# Patient Record
Sex: FEMALE | Race: WHITE | NOT HISPANIC OR LATINO | Employment: FULL TIME | URBAN - NONMETROPOLITAN AREA
[De-identification: names, ages, dates, MRNs, and addresses within clinical notes are randomized per-mention and may not be internally consistent; named-entity substitution may affect disease eponyms.]

---

## 2017-01-30 ENCOUNTER — OFFICE VISIT (OUTPATIENT)
Dept: SURGERY | Facility: CLINIC | Age: 52
End: 2017-01-30

## 2017-01-30 VITALS
HEIGHT: 67 IN | DIASTOLIC BLOOD PRESSURE: 78 MMHG | WEIGHT: 150 LBS | BODY MASS INDEX: 23.54 KG/M2 | SYSTOLIC BLOOD PRESSURE: 114 MMHG

## 2017-01-30 DIAGNOSIS — E04.1 THYROID NODULE: Primary | ICD-10-CM

## 2017-01-30 PROCEDURE — 99203 OFFICE O/P NEW LOW 30 MIN: CPT | Performed by: SURGERY

## 2017-01-30 RX ORDER — MELATONIN
1000 DAILY
COMMUNITY
End: 2019-02-21

## 2017-01-30 RX ORDER — LORAZEPAM 0.5 MG
1 TABLET ORAL DAILY
COMMUNITY
End: 2019-02-21

## 2017-01-30 RX ORDER — DESOXIMETASONE 2.5 MG/G
1 CREAM TOPICAL AS NEEDED
COMMUNITY
End: 2019-02-21

## 2017-01-30 RX ORDER — BIOTIN 10000 MCG
10 CAPSULE ORAL DAILY
COMMUNITY
End: 2019-02-21

## 2017-01-30 RX ORDER — PERPHENAZINE/AMITRIPTYLINE HCL 4MG-10MG
1000 TABLET ORAL DAILY
COMMUNITY
End: 2019-02-21

## 2017-01-30 NOTE — PROGRESS NOTES
Chief Complaint   Patient presents with   • Mass     possible thyroid nodule     Chief Complaint   Patient presents with   • Mass     possible thyroid nodule       HPI    Thyroid Nodule  Patient complains of a left thyroid nodule. The lesion was noted 1 month ago.  It has not increased in size since that time.Patient currently has symptoms of none, that have been unchanged. Symptoms have been present for 1 months. Patient denies change in size of nodule.  There is a family history of hypothyroidism. There is not a history of radiation to the neck, head or face.  Previous work up has been an US.    Past Medical History   Diagnosis Date   • Cyst of left breast        Past Surgical History   Procedure Laterality Date   • Breast cyst aspiration  10/13/2013     Fine Needle Aspiration with Imaging Guidance 57822 (Breast cyst)          Current Outpatient Prescriptions:   •  Biotin 10 MG capsule, Take 10 capsules by mouth Daily., Disp: , Rfl:   •  cholecalciferol (VITAMIN D3) 1000 UNITS tablet, Take 1,000 Units by mouth Daily., Disp: , Rfl:   •  Coconut Oil (EQL COCONUT OIL) 1000 MG capsule, Take  by mouth Daily., Disp: , Rfl:   •  desoximetasone (TOPICORT) 0.25 % cream, Apply  topically As Needed for irritation., Disp: , Rfl:   •  Misc Natural Products (TART CHERRY ADVANCED) capsule, Take  by mouth Daily., Disp: , Rfl:   •  lisinopril (PRINIVIL,ZESTRIL) 10 MG tablet, Take 10 mg by mouth Daily., Disp: , Rfl:   •  Multiple Vitamin (MULTI VITAMIN DAILY PO), Take 1 tablet by mouth Daily., Disp: , Rfl:   •  Multiple Vitamins-Minerals (MULTIVITAMIN PO), Take 1 tablet by mouth Daily., Disp: , Rfl:     Allergies   Allergen Reactions   • Aleve [Naproxen]    • Other      LACORIC ACID       No family history on file.    Social History     Social History   • Marital status: Single     Spouse name: N/A   • Number of children: N/A   • Years of education: N/A     Occupational History   • Not on file.     Social History Main Topics   •  "Smoking status: Never Smoker   • Smokeless tobacco: Not on file   • Alcohol use No   • Drug use: Not on file   • Sexual activity: Not on file     Other Topics Concern   • Not on file     Social History Narrative       Review of Systems   Constitutional: Negative for appetite change, chills, fever and unexpected weight change.   HENT: Negative for hearing loss, nosebleeds and trouble swallowing.    Eyes: Negative for visual disturbance.   Respiratory: Negative for apnea, cough, choking, chest tightness, shortness of breath, wheezing and stridor.    Cardiovascular: Positive for leg swelling. Negative for chest pain and palpitations.   Gastrointestinal: Negative for abdominal distention, abdominal pain, blood in stool, constipation, diarrhea, nausea and vomiting.   Endocrine: Negative for cold intolerance, heat intolerance, polydipsia, polyphagia and polyuria.   Genitourinary: Negative for difficulty urinating, dysuria, frequency, hematuria and urgency.   Musculoskeletal: Negative for arthralgias, back pain, myalgias and neck pain.   Skin: Negative for color change, pallor and rash.   Allergic/Immunologic: Negative for immunocompromised state.   Neurological: Negative for dizziness, seizures, syncope, light-headedness, numbness and headaches.   Hematological: Negative for adenopathy.   Psychiatric/Behavioral: Negative for suicidal ideas. The patient is not nervous/anxious.          Visit Vitals   • /78   • Ht 67\" (170.2 cm)   • Wt 150 lb (68 kg)   • BMI 23.49 kg/m2       Physical Exam   Constitutional: She appears well-developed and well-nourished. No distress.   HENT:   Head: Normocephalic and atraumatic.   Eyes: Conjunctivae and EOM are normal. Pupils are equal, round, and reactive to light.   Neck: Normal range of motion. No JVD present. No tracheal deviation present. Thyromegaly present.   Cardiovascular: Normal rate, regular rhythm and normal heart sounds.    No murmur heard.  Pulmonary/Chest: Effort normal " and breath sounds normal. No stridor. No respiratory distress. She has no wheezes. She exhibits no tenderness.   Musculoskeletal: Normal range of motion. She exhibits no edema, tenderness or deformity.   Lymphadenopathy:     She has no cervical adenopathy.   Skin: Skin is warm and dry. No rash noted. She is not diaphoretic. No erythema. No pallor.   Psychiatric: She has a normal mood and affect. Her behavior is normal. Judgment and thought content normal.   Nursing note and vitals reviewed.      DIAGNOSTIC DATA:    [facility] shows a LEFT mass in the thyroid    ASSESSMENT:    Carli was seen today for mass.    Diagnoses and all orders for this visit:    Thyroid nodule       PLAN:    FNA- risks of bleeding, infection explained.    Case Rocha MD

## 2017-02-01 ENCOUNTER — HOSPITAL ENCOUNTER (OUTPATIENT)
Dept: ULTRASOUND IMAGING | Facility: HOSPITAL | Age: 52
Discharge: HOME | End: 2017-02-01
Admitting: SURGERY

## 2017-02-01 DIAGNOSIS — E04.1 THYROID NODULE: ICD-10-CM

## 2017-02-01 LAB
LAB AP CASE REPORT: NORMAL
LAB AP DIAGNOSIS COMMENT: NORMAL
LAB AP NON-GYN SPECIMEN ADEQUACY: NORMAL
Lab: NORMAL
PATH REPORT.FINAL DX SPEC: NORMAL

## 2017-02-01 PROCEDURE — 88173 CYTOPATH EVAL FNA REPORT: CPT | Performed by: SURGERY

## 2017-02-01 PROCEDURE — 10022 US GUIDED FINE NEEDLE ASPIRATION: CPT | Performed by: SURGERY

## 2017-02-01 PROCEDURE — 88173 CYTOPATH EVAL FNA REPORT: CPT | Performed by: PATHOLOGY

## 2017-02-01 PROCEDURE — 76942 ECHO GUIDE FOR BIOPSY: CPT

## 2017-02-01 RX ORDER — LIDOCAINE HYDROCHLORIDE 10 MG/ML
10 INJECTION, SOLUTION INFILTRATION; PERINEURAL ONCE
Status: DISCONTINUED | OUTPATIENT
Start: 2017-02-01 | End: 2017-02-02 | Stop reason: HOSPADM

## 2017-02-03 ENCOUNTER — APPOINTMENT (OUTPATIENT)
Dept: PREADMISSION TESTING | Facility: HOSPITAL | Age: 52
End: 2017-02-03

## 2017-02-03 ENCOUNTER — OFFICE VISIT (OUTPATIENT)
Dept: SURGERY | Facility: CLINIC | Age: 52
End: 2017-02-03

## 2017-02-03 VITALS
DIASTOLIC BLOOD PRESSURE: 78 MMHG | WEIGHT: 159 LBS | BODY MASS INDEX: 24.96 KG/M2 | SYSTOLIC BLOOD PRESSURE: 112 MMHG | HEIGHT: 67 IN

## 2017-02-03 VITALS
WEIGHT: 158 LBS | RESPIRATION RATE: 18 BRPM | HEIGHT: 67 IN | BODY MASS INDEX: 24.8 KG/M2 | OXYGEN SATURATION: 99 % | DIASTOLIC BLOOD PRESSURE: 88 MMHG | SYSTOLIC BLOOD PRESSURE: 150 MMHG | HEART RATE: 79 BPM

## 2017-02-03 DIAGNOSIS — E04.1 THYROID NODULE: ICD-10-CM

## 2017-02-03 DIAGNOSIS — E04.1 THYROID NODULE: Primary | ICD-10-CM

## 2017-02-03 LAB — TSH SERPL DL<=0.05 MIU/L-ACNC: 1.85 MIU/ML (ref 0.46–4.68)

## 2017-02-03 PROCEDURE — 93010 ELECTROCARDIOGRAM REPORT: CPT | Performed by: INTERNAL MEDICINE

## 2017-02-03 PROCEDURE — 99212 OFFICE O/P EST SF 10 MIN: CPT | Performed by: SURGERY

## 2017-02-03 PROCEDURE — 93005 ELECTROCARDIOGRAM TRACING: CPT

## 2017-02-03 PROCEDURE — 84443 ASSAY THYROID STIM HORMONE: CPT | Performed by: SURGERY

## 2017-02-03 PROCEDURE — 36415 COLL VENOUS BLD VENIPUNCTURE: CPT

## 2017-02-03 RX ORDER — SODIUM CHLORIDE 9 MG/ML
100 INJECTION, SOLUTION INTRAVENOUS CONTINUOUS
Status: CANCELLED | OUTPATIENT
Start: 2017-02-03

## 2017-02-03 RX ORDER — SODIUM CHLORIDE 0.9 % (FLUSH) 0.9 %
1-10 SYRINGE (ML) INJECTION AS NEEDED
Status: CANCELLED | OUTPATIENT
Start: 2017-02-03

## 2017-02-03 RX ORDER — OXYMETAZOLINE HYDROCHLORIDE 0.05 G/100ML
2 SPRAY NASAL 2 TIMES DAILY PRN
COMMUNITY
End: 2019-02-21

## 2017-02-03 NOTE — PROGRESS NOTES
Chief Complaint   Patient presents with   • Follow-up     Fine needle aspiration. Biopsy of thyroid.     HPI    Final Diagnosis   [facility] Category: SUSPICIOUS FOR FOLLICULAR NEOPLASM   SUSPICIOUS for FOLLICULAR NEOPLASM.     Physical Exam   Neck: Normal range of motion. Neck supple. No JVD present. No tracheal deviation present. No thyromegaly present.       Lymphadenopathy:     She has no cervical adenopathy.     ASSESSMENT    Carli was seen today for follow-up.    Diagnoses and all orders for this visit:    Thyroid nodule  -     Case Request; Standing  -     TSH; Future  -     ECG 12 Lead; Future  -     sodium chloride 0.9 % flush 1-10 mL; Infuse 1-10 mL into a venous catheter As Needed for line care.  -     sodium chloride 0.9 % infusion; Infuse 100 mL/hr into a venous catheter Continuous.  -     ceFAZolin (ANCEF) 2 g in sodium chloride 0.9 % 100 mL IVPB; Infuse 2 g into a venous catheter 1 (One) Time.  -     Case Request    Other orders  -     Obtain informed consent  -     Follow Anesthesia Guidelines / Standing Orders; Standing  -     Verify NPO Status; Standing  -     Obtain Informed Consent; Standing  -     PHONG Hose - Place on Patient in Pre-Op; Standing  -     SCD (Sequential Compression Device) - Place on Patient in Pre-Op; Standing  -     Have Patient Void Prior to Procedure; Standing  -     Insert Peripheral IV; Standing  -     Saline Lock & Maintain IV Access; Standing    PLAN  1. LEFT thyroidectomy, possible total thyroidectomy    Thyroidectomy is explained with the risks and benefits of surgery.  Thyroidectomy is an operation in which one or both lobes of the thyroid gland are removed. The most common indications for thyroidectomy include a large mass in the thyroid gland, difficulties with breathing related to a thyroid mass, difficulties with swallowing, suspected or proven cancer of the thyroid gland and hyperthyroidism (overproduction of the thyroid hormone). The need for  thyroidectomy based on history, the results of a physical examination and tests. The procedure is done under general anesthesia. The extent of surgery (removal of one or both lobes) may sometimes be determined in the course of surgery and sometimes after surgery after microscopic examination of tissue removed during the surgery.   After surgery patients may have difficulty in swallowing. Occasionally, swallowing may even be a little painful.  This pain usually resolves within 24 to 72 hours. Bleeding or infection are also possible short term complications. Although rare in thyroid surgery, some patients may develop a thick scar or keloid.   Two complications specific to thyroid surgery are hypocalcemia and vocal cord weakness or paralysis. Hypocalcemia, or low blood levels of calcium, may occur after complete removal of both thyroid lobes. This condition is caused by injury or interference with the blood supply of four tiny glands called parathyroid glands, which are located within or very close to the thyroid gland. Hypocalcemia is usually temporary, but sometimes may require calcium supplements if sufficiently pronounced. Permanent hypocalcemia is fortunately rare.  This is why, serum calcium, magnesium and phosphorus levels are carefully monitored in the first 24 hours after the surgery.    Vocal cord weakness or paralysis may be caused by swelling, stretching, or injury to the recurrent laryngeal nerve which passes very close to the thyroid gland. Temporary hoarseness may result. Again, this is an uncommon, usually temporary complication. Permanent vocal cord paralysis is rare.  Frozen section and final diagnosis:   During surgery, the specimen removed is examined by the pathologist who performs frozen sections.  In the majority of cases, the pathologist is able to distinguish between benign and malignant lesions.  In a very small percentage of patients,however, the frozen section may not identify a small cancer  which is picked up on permanent sections, a few days later. When this happens, the patient may have to return to surgery for removal of the remaining thyroid tissue and sometimes lymph node dissection.  Depending on the final histologic (microscopic examination) diagnosis of the gland removed, and on the blood tests, continuous follow-up by the endocrinologist and / or surgeon may be indicated for replacement of the thyroid hormone. Following total thyroidectomy, patients have to take replacement thyroid hormone for the rest of their life.  In very rare circumstances, airway obstruction may occur and a tracheotomy may become necessary to gain access to the airway. This is an extremely rare life saving measure and every effort would be taken to avoid it.  I have given the patient an opportunity to ask questions, discuss alternative forms of treatment, risks of nontreatment, the procedures to be used, and the risks and hazards involved.    Signed by Case Rocha MD

## 2017-02-10 ENCOUNTER — ANESTHESIA EVENT (OUTPATIENT)
Dept: PERIOP | Facility: HOSPITAL | Age: 52
End: 2017-02-10

## 2017-02-13 ENCOUNTER — HOSPITAL ENCOUNTER (OUTPATIENT)
Facility: HOSPITAL | Age: 52
Setting detail: HOSPITAL OUTPATIENT SURGERY
Discharge: HOME | End: 2017-02-13
Attending: SURGERY | Admitting: SURGERY

## 2017-02-13 ENCOUNTER — ANESTHESIA (OUTPATIENT)
Dept: PERIOP | Facility: HOSPITAL | Age: 52
End: 2017-02-13

## 2017-02-13 VITALS
HEART RATE: 80 BPM | SYSTOLIC BLOOD PRESSURE: 170 MMHG | RESPIRATION RATE: 18 BRPM | HEIGHT: 67 IN | WEIGHT: 157.63 LBS | TEMPERATURE: 97.4 F | OXYGEN SATURATION: 94 % | BODY MASS INDEX: 24.74 KG/M2 | DIASTOLIC BLOOD PRESSURE: 82 MMHG

## 2017-02-13 DIAGNOSIS — E04.1 THYROID NODULE: ICD-10-CM

## 2017-02-13 PROCEDURE — 88307 TISSUE EXAM BY PATHOLOGIST: CPT | Performed by: SURGERY

## 2017-02-13 PROCEDURE — 88307 TISSUE EXAM BY PATHOLOGIST: CPT | Performed by: PATHOLOGY

## 2017-02-13 PROCEDURE — 25010000002 DEXAMETHASONE PER 1 MG: Performed by: ANESTHESIOLOGY

## 2017-02-13 PROCEDURE — 25010000002 ONDANSETRON PER 1 MG: Performed by: NURSE ANESTHETIST, CERTIFIED REGISTERED

## 2017-02-13 PROCEDURE — 25010000003 CEFAZOLIN PER 500 MG: Performed by: SURGERY

## 2017-02-13 PROCEDURE — 25010000002 FENTANYL CITRATE (PF) 100 MCG/2ML SOLUTION: Performed by: ANESTHESIOLOGY

## 2017-02-13 PROCEDURE — 25010000002 MIDAZOLAM PER 1 MG: Performed by: ANESTHESIOLOGY

## 2017-02-13 PROCEDURE — 25010000002 PROPOFOL 10 MG/ML EMULSION: Performed by: ANESTHESIOLOGY

## 2017-02-13 PROCEDURE — 25010000002 NEOSTIGMINE 10 MG/10ML SOLUTION: Performed by: NURSE ANESTHETIST, CERTIFIED REGISTERED

## 2017-02-13 PROCEDURE — 60220 PARTIAL REMOVAL OF THYROID: CPT | Performed by: SURGERY

## 2017-02-13 RX ORDER — LABETALOL HYDROCHLORIDE 5 MG/ML
5 INJECTION, SOLUTION INTRAVENOUS
Status: DISCONTINUED | OUTPATIENT
Start: 2017-02-13 | End: 2017-02-13 | Stop reason: HOSPADM

## 2017-02-13 RX ORDER — METOCLOPRAMIDE HYDROCHLORIDE 5 MG/ML
10 INJECTION INTRAMUSCULAR; INTRAVENOUS ONCE AS NEEDED
Status: DISCONTINUED | OUTPATIENT
Start: 2017-02-13 | End: 2017-02-13 | Stop reason: HOSPADM

## 2017-02-13 RX ORDER — DIPHENHYDRAMINE HYDROCHLORIDE 50 MG/ML
12.5 INJECTION INTRAMUSCULAR; INTRAVENOUS
Status: DISCONTINUED | OUTPATIENT
Start: 2017-02-13 | End: 2017-02-13 | Stop reason: HOSPADM

## 2017-02-13 RX ORDER — GLYCOPYRROLATE 0.2 MG/ML
INJECTION INTRAMUSCULAR; INTRAVENOUS AS NEEDED
Status: DISCONTINUED | OUTPATIENT
Start: 2017-02-13 | End: 2017-02-13 | Stop reason: SURG

## 2017-02-13 RX ORDER — ACETAMINOPHEN 650 MG/1
650 SUPPOSITORY RECTAL ONCE AS NEEDED
Status: DISCONTINUED | OUTPATIENT
Start: 2017-02-13 | End: 2017-02-13 | Stop reason: SDUPTHER

## 2017-02-13 RX ORDER — NALOXONE HCL 0.4 MG/ML
0.2 VIAL (ML) INJECTION AS NEEDED
Status: DISCONTINUED | OUTPATIENT
Start: 2017-02-13 | End: 2017-02-13 | Stop reason: HOSPADM

## 2017-02-13 RX ORDER — MIDAZOLAM HYDROCHLORIDE 1 MG/ML
INJECTION INTRAMUSCULAR; INTRAVENOUS AS NEEDED
Status: DISCONTINUED | OUTPATIENT
Start: 2017-02-13 | End: 2017-02-13 | Stop reason: SURG

## 2017-02-13 RX ORDER — ONDANSETRON 2 MG/ML
4 INJECTION INTRAMUSCULAR; INTRAVENOUS EVERY 6 HOURS PRN
Status: DISCONTINUED | OUTPATIENT
Start: 2017-02-13 | End: 2017-02-13 | Stop reason: HOSPADM

## 2017-02-13 RX ORDER — ACETAMINOPHEN 325 MG/1
650 TABLET ORAL ONCE AS NEEDED
Status: DISCONTINUED | OUTPATIENT
Start: 2017-02-13 | End: 2017-02-13 | Stop reason: SDUPTHER

## 2017-02-13 RX ORDER — FLUMAZENIL 0.1 MG/ML
0.2 INJECTION INTRAVENOUS AS NEEDED
Status: DISCONTINUED | OUTPATIENT
Start: 2017-02-13 | End: 2017-02-13 | Stop reason: HOSPADM

## 2017-02-13 RX ORDER — ROCURONIUM BROMIDE 10 MG/ML
INJECTION, SOLUTION INTRAVENOUS AS NEEDED
Status: DISCONTINUED | OUTPATIENT
Start: 2017-02-13 | End: 2017-02-13 | Stop reason: SURG

## 2017-02-13 RX ORDER — SODIUM CHLORIDE 0.9 % (FLUSH) 0.9 %
1-10 SYRINGE (ML) INJECTION AS NEEDED
Status: DISCONTINUED | OUTPATIENT
Start: 2017-02-13 | End: 2017-02-13 | Stop reason: HOSPADM

## 2017-02-13 RX ORDER — FENTANYL CITRATE 50 UG/ML
INJECTION, SOLUTION INTRAMUSCULAR; INTRAVENOUS AS NEEDED
Status: DISCONTINUED | OUTPATIENT
Start: 2017-02-13 | End: 2017-02-13 | Stop reason: SURG

## 2017-02-13 RX ORDER — NEOSTIGMINE METHYLSULFATE 1 MG/ML
INJECTION, SOLUTION INTRAVENOUS AS NEEDED
Status: DISCONTINUED | OUTPATIENT
Start: 2017-02-13 | End: 2017-02-13 | Stop reason: SURG

## 2017-02-13 RX ORDER — HYDROCODONE BITARTRATE AND ACETAMINOPHEN 7.5; 325 MG/1; MG/1
1 TABLET ORAL EVERY 4 HOURS PRN
Qty: 20 TABLET | Refills: 0 | Status: SHIPPED | OUTPATIENT
Start: 2017-02-13 | End: 2017-03-08 | Stop reason: HOSPADM

## 2017-02-13 RX ORDER — DIPHENHYDRAMINE HYDROCHLORIDE 50 MG/ML
12.5 INJECTION INTRAMUSCULAR; INTRAVENOUS
Status: DISCONTINUED | OUTPATIENT
Start: 2017-02-13 | End: 2017-02-13 | Stop reason: SDUPTHER

## 2017-02-13 RX ORDER — HYDRALAZINE HYDROCHLORIDE 20 MG/ML
5 INJECTION INTRAMUSCULAR; INTRAVENOUS
Status: DISCONTINUED | OUTPATIENT
Start: 2017-02-13 | End: 2017-02-13 | Stop reason: HOSPADM

## 2017-02-13 RX ORDER — ACETAMINOPHEN 650 MG/1
650 SUPPOSITORY RECTAL ONCE AS NEEDED
Status: DISCONTINUED | OUTPATIENT
Start: 2017-02-13 | End: 2017-02-13 | Stop reason: HOSPADM

## 2017-02-13 RX ORDER — HYDRALAZINE HYDROCHLORIDE 20 MG/ML
5 INJECTION INTRAMUSCULAR; INTRAVENOUS
Status: DISCONTINUED | OUTPATIENT
Start: 2017-02-13 | End: 2017-02-13 | Stop reason: SDUPTHER

## 2017-02-13 RX ORDER — DEXAMETHASONE SODIUM PHOSPHATE 4 MG/ML
8 INJECTION, SOLUTION INTRA-ARTICULAR; INTRALESIONAL; INTRAMUSCULAR; INTRAVENOUS; SOFT TISSUE ONCE AS NEEDED
Status: DISCONTINUED | OUTPATIENT
Start: 2017-02-13 | End: 2017-02-13 | Stop reason: HOSPADM

## 2017-02-13 RX ORDER — ACETAMINOPHEN 325 MG/1
650 TABLET ORAL ONCE AS NEEDED
Status: DISCONTINUED | OUTPATIENT
Start: 2017-02-13 | End: 2017-02-13 | Stop reason: HOSPADM

## 2017-02-13 RX ORDER — MORPHINE SULFATE 2 MG/ML
2 INJECTION, SOLUTION INTRAMUSCULAR; INTRAVENOUS
Status: DISCONTINUED | OUTPATIENT
Start: 2017-02-13 | End: 2017-02-13 | Stop reason: HOSPADM

## 2017-02-13 RX ORDER — DEXAMETHASONE SODIUM PHOSPHATE 4 MG/ML
8 INJECTION, SOLUTION INTRA-ARTICULAR; INTRALESIONAL; INTRAMUSCULAR; INTRAVENOUS; SOFT TISSUE ONCE AS NEEDED
Status: COMPLETED | OUTPATIENT
Start: 2017-02-13 | End: 2017-02-13

## 2017-02-13 RX ORDER — ONDANSETRON 2 MG/ML
4 INJECTION INTRAMUSCULAR; INTRAVENOUS ONCE AS NEEDED
Status: COMPLETED | OUTPATIENT
Start: 2017-02-13 | End: 2017-02-13

## 2017-02-13 RX ORDER — HYDROCODONE BITARTRATE AND ACETAMINOPHEN 7.5; 325 MG/1; MG/1
1 TABLET ORAL ONCE AS NEEDED
Status: DISCONTINUED | OUTPATIENT
Start: 2017-02-13 | End: 2017-02-13 | Stop reason: HOSPADM

## 2017-02-13 RX ORDER — PROPOFOL 10 MG/ML
VIAL (ML) INTRAVENOUS AS NEEDED
Status: DISCONTINUED | OUTPATIENT
Start: 2017-02-13 | End: 2017-02-13 | Stop reason: SURG

## 2017-02-13 RX ORDER — SODIUM CHLORIDE 9 MG/ML
100 INJECTION, SOLUTION INTRAVENOUS CONTINUOUS
Status: DISCONTINUED | OUTPATIENT
Start: 2017-02-13 | End: 2017-02-13 | Stop reason: HOSPADM

## 2017-02-13 RX ORDER — ONDANSETRON 2 MG/ML
4 INJECTION INTRAMUSCULAR; INTRAVENOUS ONCE AS NEEDED
Status: DISCONTINUED | OUTPATIENT
Start: 2017-02-13 | End: 2017-02-13 | Stop reason: HOSPADM

## 2017-02-13 RX ADMIN — FENTANYL CITRATE 50 MCG: 50 INJECTION, SOLUTION INTRAMUSCULAR; INTRAVENOUS at 15:25

## 2017-02-13 RX ADMIN — PROPOFOL 150 MG: 10 INJECTION, EMULSION INTRAVENOUS at 15:25

## 2017-02-13 RX ADMIN — FENTANYL CITRATE 50 MCG: 50 INJECTION, SOLUTION INTRAMUSCULAR; INTRAVENOUS at 16:02

## 2017-02-13 RX ADMIN — GLYCOPYRROLATE 0.2 MG: 0.2 INJECTION, SOLUTION INTRAMUSCULAR; INTRAVENOUS at 17:29

## 2017-02-13 RX ADMIN — NEOSTIGMINE METHYLSULFATE 1 MG: 1 INJECTION, SOLUTION INTRAVENOUS at 17:29

## 2017-02-13 RX ADMIN — FENTANYL CITRATE 50 MCG: 50 INJECTION, SOLUTION INTRAMUSCULAR; INTRAVENOUS at 15:58

## 2017-02-13 RX ADMIN — MIDAZOLAM 2 MG: 1 INJECTION INTRAMUSCULAR; INTRAVENOUS at 15:16

## 2017-02-13 RX ADMIN — ROCURONIUM BROMIDE 20 MG: 10 INJECTION INTRAVENOUS at 15:55

## 2017-02-13 RX ADMIN — FENTANYL CITRATE 50 MCG: 50 INJECTION, SOLUTION INTRAMUSCULAR; INTRAVENOUS at 16:15

## 2017-02-13 RX ADMIN — CEFAZOLIN SODIUM 2 G: 1 INJECTION, POWDER, FOR SOLUTION INTRAMUSCULAR; INTRAVENOUS at 15:32

## 2017-02-13 RX ADMIN — DEXAMETHASONE SODIUM PHOSPHATE 8 MG: 4 INJECTION, SOLUTION INTRAMUSCULAR; INTRAVENOUS at 18:20

## 2017-02-13 RX ADMIN — FENTANYL CITRATE 50 MCG: 50 INJECTION, SOLUTION INTRAMUSCULAR; INTRAVENOUS at 16:20

## 2017-02-13 RX ADMIN — ONDANSETRON 4 MG: 2 INJECTION INTRAMUSCULAR; INTRAVENOUS at 17:57

## 2017-02-13 RX ADMIN — ROCURONIUM BROMIDE 30 MG: 10 INJECTION INTRAVENOUS at 15:25

## 2017-02-13 RX ADMIN — SODIUM CHLORIDE 100 ML/HR: 9 INJECTION, SOLUTION INTRAVENOUS at 13:35

## 2017-02-13 NOTE — ANESTHESIA PROCEDURE NOTES
Airway  Airway not difficult    General Information and Staff    Anesthesiologist: TONYA MARTIN    Indications and Patient Condition    Preoxygenated: yes  Mask difficulty assessment: 1 - vent by mask    Final Airway Details  Final airway type: endotracheal airway      Successful airway: ETT  Cuffed: yes   Successful intubation technique: direct laryngoscopy  Endotracheal tube insertion site: oral  Blade: Eugenio  Blade size: #3  ETT size: 7.0 mm  Cormack-Lehane Classification: grade IIb - view of arytenoids or posterior of glottis only  Placement verified by: chest auscultation and capnometry   Inital cuff pressure (cm H2O): 21    Additional Comments  Teeth checked after intubation, no damage noted.

## 2017-02-13 NOTE — DISCHARGE INSTRUCTIONS
THYROID/PARATHYROID ADENOMA EXCISION  POST OP INSTRUCTIONS  Dr. Case Rocha  [address] [place] [address]  Phone: [phone] (office)  [phone] (hospital)    ACTIVITIES:  1. Keep your head elevated at 30 degrees or more as much as possible for the first 24 hours after surgery.  This will help reduce your postoperative swelling and thus decrease your pain.  2. Expect to rest most of the first two days after surgery, but do get up several times daily to reduce the risk of getting a clot in your legs.  3. No strenuous activity or lifting over 10 lbs. for one week.  Add 5 lbs. a week to that restriction until 6 weeks out from surgery.  Try to avoid squatting and deep bends for about a week.  4. Do not drive while on pain medicine.  You must be off pain meds 24 hours before driving.  5. You can climb stairs, but minimize this and initially do one step at a time (both feet on one step rather than going up with each step.)  SYMPTOMS:  1. Some minor discomfort with swallowing can be expected.  Report any extensive difficulty to the office immediately.  Most symptoms resolve within a few days.   2. You may have numbness around the mouth or cramping after surgery. This may be a sign of a low calcium level or a rapid drop in calcium level. You may take calcitriol if prescribed as directed for your symptoms to help with calcium absorption.  3. Some neck swelling is expected and will resolve over the next few weeks.  Rapid neck swelling should prompt a call to the office or 911.  4. Constipation is common when taking pain medication for surgery.  Over the counter laxatives, such as Miralax, can be used temporarily.   5. Fatigue and decreased stamina is not unusual for about a week or so after surgery due to anesthesia.  Try to take walks and some mild activity between resting.  WOUND SITE:  1. Dressings for this surgery are minimal and consist of a steristrip over the incision with sutures internally.  This  will begin to peel off after the first couple of weeks.  2. Showering may occur while the “film” is in place the day after surgery.   MEALS:  1. A soft diet is recommended the first 1-2 days after surgery and this can be expanded to a regular diet as tolerated.  2. Do NOT take pain pills on an empty stomach.  WORK:  1. In general if you have a sedentary job, you can return to work in 7-10 days.  If lifting or exertion is required it may be 2-3 weeks.    2. Use discretion and remember that your stamina will be decreased post operatively.  3. Return to work notes can be provided at the time of your post-operative appointment.  FOLLOW UP:  1. If no appointment is given, please call and make a post-operative appointment for approximately 7-10 days after the procedure.      MEDICATIONS:  1. You are being discharged with [place] for pain.

## 2017-02-13 NOTE — OP NOTE
THYROIDECTOMY  Procedure Note    Carli Helms Mlcrystal  2/13/2017    Pre-op Diagnosis:   Thyroid nodule [E04.1]    Post-op Diagnosis:     Post-Op Diagnosis Codes:     * Thyroid nodule [E04.1]    Procedure(s):  LEFT THYROIDECTOMY, POSSIBLE TOTAL    Surgeon(s):  Case Rocha MD    Anesthesia: General    Staff:   Circulator: Anna Taveras, CHRISSIE; Nikkie Zaman RN  Scrub Person: Cecilia Orozco; Christen Garcia V  Assistant: Sruthi Valdez CSA    Estimated Blood Loss: 20 cc  Specimens:                  ID Type Source Tests Collected by Time Destination   A : Left thyroid lobe and isthmus Tissue Thyroid TISSUE EXAM Case Rocha MD 2/13/2017 1620          Drains:    None       Findings: LEFT thyroid mass    Complications: None    PROCEDURE: The patient is brought to the operating room and placed supine on the operating table.  The neck is prepped and draped in a sterile manner and extended.  A transverse, low collar skin incision is made and carried through the platysma.  The skin is retracted and subplatysmal flaps were created superiorly and inferiorly.  The straps were  and the anterior surface of the thyroid came into view. The left side of the thyroid was palpably normal. The left side demonstrated a large nodular mass in the middle portion.  The upper and lower pole vessels were encircled with large blue loops.  The recurrent nerve was clearly identified after careful dissection.  It was uninjured through its entire course.the upper pole was taken down by doubly clamping and tying  and suture ligating the vessels with 3-0 silk suture.  The smaller vessels were taken down between clamps and ties of 3-0 silk suture, and bipolar cautery was used to divide smaller vessels away from the nerve.  In this manner, the thyroid was rotated out of its bed and dissected free of the trachea until the junction of the right lobe and the isthmus was achieved.  At this point I placed a right angle clamp across the isthmus and  divided the junction of the right lobe and the isthmus sharply.  The specimen was removed from the operative field.  The isthmus was then suture ligated with 3-0 silk suture.  No bleeding was noted from any site. The patient was given several deep breaths and no bleeding was noted.  Both parathyroids and recurrent nerve were visualized. A small piece of Surgicel Snow as placed into the wound bed for added hemostasis.  The wraps were then brought together with interrupted 3-0 Vicryl sutures.  The platysma was brought together with interrupted 3-0 Vicryl suture.  The skin was brought together with a running 4-0 subcuticular Vicryl suture.  Procedure was terminated. The patient tolerated it well. Sponge and needle counts were correct.  The patient was returned to recovery in satisfactory condition.      Caes Rocha MD     Date: 2/13/2017  Time: 5:52 PM

## 2017-02-13 NOTE — ANESTHESIA PREPROCEDURE EVALUATION
Anesthesia Evaluation     no history of anesthetic complications:  NPO Status: > 8 hours   Airway   Mallampati: II  no difficulty expected  Dental      Pulmonary - normal exam   (+) sleep apnea on CPAP,   (-) COPD, asthma, not a smoker  Cardiovascular - normal exam  Exercise tolerance: good (4-7 METS)    (+) hypertension (hx htn, no meds now),   (-) past MI, CAD, dysrhythmias, angina, cardiac stents, CABG      Neuro/Psych  (-) CVA, syncope  GI/Hepatic/Renal/Endo    (-) renal disease, diabetes, hypothyroidism    Musculoskeletal     Abdominal    Substance History      OB/GYN          Other                                    Anesthesia Plan    ASA 2     general   (Fire safety precautions  )  Anesthetic plan and risks discussed with patient.

## 2017-02-13 NOTE — H&P (VIEW-ONLY)
Chief Complaint   Patient presents with   • Follow-up     Fine needle aspiration. Biopsy of thyroid.     HPI    Final Diagnosis   [facility] Category: SUSPICIOUS FOR FOLLICULAR NEOPLASM   SUSPICIOUS for FOLLICULAR NEOPLASM.     Physical Exam   Neck: Normal range of motion. Neck supple. No JVD present. No tracheal deviation present. No thyromegaly present.       Lymphadenopathy:     She has no cervical adenopathy.     ASSESSMENT    Carli was seen today for follow-up.    Diagnoses and all orders for this visit:    Thyroid nodule  -     Case Request; Standing  -     TSH; Future  -     ECG 12 Lead; Future  -     sodium chloride 0.9 % flush 1-10 mL; Infuse 1-10 mL into a venous catheter As Needed for line care.  -     sodium chloride 0.9 % infusion; Infuse 100 mL/hr into a venous catheter Continuous.  -     ceFAZolin (ANCEF) 2 g in sodium chloride 0.9 % 100 mL IVPB; Infuse 2 g into a venous catheter 1 (One) Time.  -     Case Request    Other orders  -     Obtain informed consent  -     Follow Anesthesia Guidelines / Standing Orders; Standing  -     Verify NPO Status; Standing  -     Obtain Informed Consent; Standing  -     PHONG Hose - Place on Patient in Pre-Op; Standing  -     SCD (Sequential Compression Device) - Place on Patient in Pre-Op; Standing  -     Have Patient Void Prior to Procedure; Standing  -     Insert Peripheral IV; Standing  -     Saline Lock & Maintain IV Access; Standing    PLAN  1. LEFT thyroidectomy, possible total thyroidectomy    Thyroidectomy is explained with the risks and benefits of surgery.  Thyroidectomy is an operation in which one or both lobes of the thyroid gland are removed. The most common indications for thyroidectomy include a large mass in the thyroid gland, difficulties with breathing related to a thyroid mass, difficulties with swallowing, suspected or proven cancer of the thyroid gland and hyperthyroidism (overproduction of the thyroid hormone). The need for  thyroidectomy based on history, the results of a physical examination and tests. The procedure is done under general anesthesia. The extent of surgery (removal of one or both lobes) may sometimes be determined in the course of surgery and sometimes after surgery after microscopic examination of tissue removed during the surgery.   After surgery patients may have difficulty in swallowing. Occasionally, swallowing may even be a little painful.  This pain usually resolves within 24 to 72 hours. Bleeding or infection are also possible short term complications. Although rare in thyroid surgery, some patients may develop a thick scar or keloid.   Two complications specific to thyroid surgery are hypocalcemia and vocal cord weakness or paralysis. Hypocalcemia, or low blood levels of calcium, may occur after complete removal of both thyroid lobes. This condition is caused by injury or interference with the blood supply of four tiny glands called parathyroid glands, which are located within or very close to the thyroid gland. Hypocalcemia is usually temporary, but sometimes may require calcium supplements if sufficiently pronounced. Permanent hypocalcemia is fortunately rare.  This is why, serum calcium, magnesium and phosphorus levels are carefully monitored in the first 24 hours after the surgery.    Vocal cord weakness or paralysis may be caused by swelling, stretching, or injury to the recurrent laryngeal nerve which passes very close to the thyroid gland. Temporary hoarseness may result. Again, this is an uncommon, usually temporary complication. Permanent vocal cord paralysis is rare.  Frozen section and final diagnosis:   During surgery, the specimen removed is examined by the pathologist who performs frozen sections.  In the majority of cases, the pathologist is able to distinguish between benign and malignant lesions.  In a very small percentage of patients,however, the frozen section may not identify a small cancer  which is picked up on permanent sections, a few days later. When this happens, the patient may have to return to surgery for removal of the remaining thyroid tissue and sometimes lymph node dissection.  Depending on the final histologic (microscopic examination) diagnosis of the gland removed, and on the blood tests, continuous follow-up by the endocrinologist and / or surgeon may be indicated for replacement of the thyroid hormone. Following total thyroidectomy, patients have to take replacement thyroid hormone for the rest of their life.  In very rare circumstances, airway obstruction may occur and a tracheotomy may become necessary to gain access to the airway. This is an extremely rare life saving measure and every effort would be taken to avoid it.  I have given the patient an opportunity to ask questions, discuss alternative forms of treatment, risks of nontreatment, the procedures to be used, and the risks and hazards involved.    Signed by Case Rocha MD

## 2017-02-13 NOTE — H&P (VIEW-ONLY)
Chief Complaint   Patient presents with   • Mass     possible thyroid nodule     Chief Complaint   Patient presents with   • Mass     possible thyroid nodule       HPI    Thyroid Nodule  Patient complains of a left thyroid nodule. The lesion was noted 1 month ago.  It has not increased in size since that time.Patient currently has symptoms of none, that have been unchanged. Symptoms have been present for 1 months. Patient denies change in size of nodule.  There is a family history of hypothyroidism. There is not a history of radiation to the neck, head or face.  Previous work up has been an US.    Past Medical History   Diagnosis Date   • Cyst of left breast        Past Surgical History   Procedure Laterality Date   • Breast cyst aspiration  10/13/2013     Fine Needle Aspiration with Imaging Guidance 37396 (Breast cyst)          Current Outpatient Prescriptions:   •  Biotin 10 MG capsule, Take 10 capsules by mouth Daily., Disp: , Rfl:   •  cholecalciferol (VITAMIN D3) 1000 UNITS tablet, Take 1,000 Units by mouth Daily., Disp: , Rfl:   •  Coconut Oil (EQL COCONUT OIL) 1000 MG capsule, Take  by mouth Daily., Disp: , Rfl:   •  desoximetasone (TOPICORT) 0.25 % cream, Apply  topically As Needed for irritation., Disp: , Rfl:   •  Misc Natural Products (TART CHERRY ADVANCED) capsule, Take  by mouth Daily., Disp: , Rfl:   •  lisinopril (PRINIVIL,ZESTRIL) 10 MG tablet, Take 10 mg by mouth Daily., Disp: , Rfl:   •  Multiple Vitamin (MULTI VITAMIN DAILY PO), Take 1 tablet by mouth Daily., Disp: , Rfl:   •  Multiple Vitamins-Minerals (MULTIVITAMIN PO), Take 1 tablet by mouth Daily., Disp: , Rfl:     Allergies   Allergen Reactions   • Aleve [Naproxen]    • Other      LACORIC ACID       No family history on file.    Social History     Social History   • Marital status: Single     Spouse name: N/A   • Number of children: N/A   • Years of education: N/A     Occupational History   • Not on file.     Social History Main Topics   •  "Smoking status: Never Smoker   • Smokeless tobacco: Not on file   • Alcohol use No   • Drug use: Not on file   • Sexual activity: Not on file     Other Topics Concern   • Not on file     Social History Narrative       Review of Systems   Constitutional: Negative for appetite change, chills, fever and unexpected weight change.   HENT: Negative for hearing loss, nosebleeds and trouble swallowing.    Eyes: Negative for visual disturbance.   Respiratory: Negative for apnea, cough, choking, chest tightness, shortness of breath, wheezing and stridor.    Cardiovascular: Positive for leg swelling. Negative for chest pain and palpitations.   Gastrointestinal: Negative for abdominal distention, abdominal pain, blood in stool, constipation, diarrhea, nausea and vomiting.   Endocrine: Negative for cold intolerance, heat intolerance, polydipsia, polyphagia and polyuria.   Genitourinary: Negative for difficulty urinating, dysuria, frequency, hematuria and urgency.   Musculoskeletal: Negative for arthralgias, back pain, myalgias and neck pain.   Skin: Negative for color change, pallor and rash.   Allergic/Immunologic: Negative for immunocompromised state.   Neurological: Negative for dizziness, seizures, syncope, light-headedness, numbness and headaches.   Hematological: Negative for adenopathy.   Psychiatric/Behavioral: Negative for suicidal ideas. The patient is not nervous/anxious.          Visit Vitals   • /78   • Ht 67\" (170.2 cm)   • Wt 150 lb (68 kg)   • BMI 23.49 kg/m2       Physical Exam   Constitutional: She appears well-developed and well-nourished. No distress.   HENT:   Head: Normocephalic and atraumatic.   Eyes: Conjunctivae and EOM are normal. Pupils are equal, round, and reactive to light.   Neck: Normal range of motion. No JVD present. No tracheal deviation present. Thyromegaly present.   Cardiovascular: Normal rate, regular rhythm and normal heart sounds.    No murmur heard.  Pulmonary/Chest: Effort normal " and breath sounds normal. No stridor. No respiratory distress. She has no wheezes. She exhibits no tenderness.   Musculoskeletal: Normal range of motion. She exhibits no edema, tenderness or deformity.   Lymphadenopathy:     She has no cervical adenopathy.   Skin: Skin is warm and dry. No rash noted. She is not diaphoretic. No erythema. No pallor.   Psychiatric: She has a normal mood and affect. Her behavior is normal. Judgment and thought content normal.   Nursing note and vitals reviewed.      DIAGNOSTIC DATA:    [facility] a LEFT mass in the thyroid    ASSESSMENT:    Carli was seen today for mass.    Diagnoses and all orders for this visit:    Thyroid nodule       PLAN:    FNA- risks of bleeding, infection explained.    Case Rocha MD

## 2017-02-14 NOTE — DISCHARGE INSTR - APPOINTMENTS
CALL AND MAKE FOLLOW UP APPOINTMENT WITH DR JOYCE AT [facility] FOR 1 WEEK [phone])

## 2017-02-14 NOTE — ANESTHESIA POSTPROCEDURE EVALUATION
Patient: Carli Dunn    Procedure Summary     Date Anesthesia Start Anesthesia Stop Room / Location    02/13/17 8171 3041  [facility] OR 09 / [facility]       Procedure Diagnosis Surgeon Provider    LEFT THYROIDECTOMY, POSSIBLE TOTAL (Left Neck) Thyroid nodule  (Thyroid nodule [E04.1]) MD Juliano Wolf MD          Anesthesia Type: general  Last vitals  /86 (02/13/17 1830)    Temp 97.8 °F (36.6 °C) (02/13/17 1745)    Pulse 78 (02/13/17 1830)   Resp 12 (02/13/17 1830)    SpO2 93 % (02/13/17 1830)      Post Anesthesia Care and Evaluation    Patient location during evaluation: bedside  Patient participation: complete - patient participated  Level of consciousness: awake  Pain management: adequate  Airway patency: patent  Anesthetic complications: No anesthetic complications    Cardiovascular status: acceptable  Respiratory status: acceptable  Hydration status: acceptable    Comments:

## 2017-02-14 NOTE — PLAN OF CARE
Problem: Patient Care Overview (Adult)  Goal: Plan of Care Review  Outcome: Ongoing (interventions implemented as appropriate)    02/13/17 7731   Coping/Psychosocial Response Interventions   Plan Of Care Reviewed With patient   Patient Care Overview   Progress improving   Outcome Evaluation   Outcome Summary/Follow up Plan Pt. awake, vital signs stable. BP remains elevated, does not meet criteria for treatment. Dr. Pérez aware of current BP (175/84) . Nausea improving after meds. Pt. requesting to use the restroom to urinate. Has been unable to successfully use bedpan in PACU. Pt. is calm, no distress noted.

## 2017-02-16 LAB
LAB AP CASE REPORT: NORMAL
LAB AP INTRADEPARTMENTAL CONSULT: NORMAL
Lab: NORMAL
PATH REPORT.FINAL DX SPEC: NORMAL
PATH REPORT.GROSS SPEC: NORMAL

## 2017-02-20 ENCOUNTER — OFFICE VISIT (OUTPATIENT)
Dept: SURGERY | Facility: CLINIC | Age: 52
End: 2017-02-20

## 2017-02-20 VITALS
HEIGHT: 67 IN | WEIGHT: 157 LBS | DIASTOLIC BLOOD PRESSURE: 78 MMHG | BODY MASS INDEX: 24.64 KG/M2 | SYSTOLIC BLOOD PRESSURE: 118 MMHG

## 2017-02-20 DIAGNOSIS — D34 FOLLICULAR ADENOMA: Primary | ICD-10-CM

## 2017-02-20 PROCEDURE — 99024 POSTOP FOLLOW-UP VISIT: CPT | Performed by: SURGERY

## 2017-02-20 NOTE — PROGRESS NOTES
Chief Complaint   Patient presents with   • Follow-up     Post operative thyroidectomy     HPI    Final Diagnosis   LEFT LOBE AND ISTHMUS, THYROID GLAND:  FOLLICULAR ADENOMA.         Electronically signed by Vern Gastelum MD on 2/16/2017 at 1054     Physical Exam   Neck: Normal range of motion. Neck supple.       Nursing note and vitals reviewed.    Strong voice    ASSESSMENT    Carli was seen today for follow-up.    Diagnoses and all orders for this visit:    Follicular adenoma  -     Reflexed T3 Free; Future  -     T4; Future  -     TSH; Future         PLAN    1. Recheck in 3 weeks  2. TFT in 3 weeks    Case Rocha MD

## 2017-03-06 ENCOUNTER — LAB (OUTPATIENT)
Dept: LAB | Facility: HOSPITAL | Age: 52
End: 2017-03-06
Attending: SURGERY

## 2017-03-06 ENCOUNTER — APPOINTMENT (OUTPATIENT)
Dept: LAB | Facility: HOSPITAL | Age: 52
End: 2017-03-06

## 2017-03-06 ENCOUNTER — LAB (OUTPATIENT)
Dept: LAB | Facility: HOSPITAL | Age: 52
End: 2017-03-06

## 2017-03-06 DIAGNOSIS — D34 FOLLICULAR ADENOMA: ICD-10-CM

## 2017-03-06 LAB
T4 SERPL-MCNC: 5.66 MCG/DL (ref 5.5–11)
TSH SERPL DL<=0.05 MIU/L-ACNC: 12.1 MIU/ML (ref 0.46–4.68)

## 2017-03-06 PROCEDURE — 84443 ASSAY THYROID STIM HORMONE: CPT

## 2017-03-06 PROCEDURE — 84436 ASSAY OF TOTAL THYROXINE: CPT

## 2017-03-06 PROCEDURE — 36415 COLL VENOUS BLD VENIPUNCTURE: CPT

## 2017-03-08 ENCOUNTER — OFFICE VISIT (OUTPATIENT)
Dept: SURGERY | Facility: CLINIC | Age: 52
End: 2017-03-08

## 2017-03-08 VITALS
HEIGHT: 67 IN | DIASTOLIC BLOOD PRESSURE: 70 MMHG | WEIGHT: 162 LBS | BODY MASS INDEX: 25.43 KG/M2 | SYSTOLIC BLOOD PRESSURE: 120 MMHG

## 2017-03-08 DIAGNOSIS — E89.0 HX OF THYROIDECTOMY: Primary | ICD-10-CM

## 2017-03-08 PROCEDURE — 99024 POSTOP FOLLOW-UP VISIT: CPT | Performed by: SURGERY

## 2017-03-08 RX ORDER — LEVOTHYROXINE SODIUM 0.1 MG/1
100 TABLET ORAL DAILY
Qty: 30 TABLET | Refills: 11 | Status: SHIPPED | OUTPATIENT
Start: 2017-03-08 | End: 2018-03-08

## 2017-04-03 ENCOUNTER — OFFICE VISIT (OUTPATIENT)
Dept: SURGERY | Facility: CLINIC | Age: 52
End: 2017-04-03

## 2017-04-03 VITALS
SYSTOLIC BLOOD PRESSURE: 110 MMHG | BODY MASS INDEX: 25.43 KG/M2 | DIASTOLIC BLOOD PRESSURE: 82 MMHG | HEIGHT: 67 IN | WEIGHT: 162 LBS

## 2017-04-03 DIAGNOSIS — E03.9 HYPOTHYROIDISM (ACQUIRED): Primary | ICD-10-CM

## 2017-04-03 PROCEDURE — 99024 POSTOP FOLLOW-UP VISIT: CPT | Performed by: SURGERY

## 2017-04-03 NOTE — PROGRESS NOTES
Chief Complaint   Patient presents with   • Follow-up     recheck thyroidectomy    • Neck Pain     left side.        HPI    Feels well. No weakness. On 0.1 mg/day synthroid.     TSH [[id number]] (Order [id number])   Lab   Date: 3/6/2017 Department: [facility]ORAT[facility] Released By: Nando Baldwin Authorizing: Case Rocha MD          TSH   Order: [id number]   Status:  Final result   Visible to patient:  Yes (MyChart) Dx:  Follicular adenoma         Ref Range & Units 4wk ago   1mo ago      TSH 0.460 - 4.680 mIU/mL 12.100 (H) 1.850   Resulting Agency  [facility]      Specimen Collected: 03/06/17  6:03 PM Last Resulted: 03/06/17  7:15 PM                       Related Result Highlights       T4  Final result 3/6/2017            TSH [[id number]] (Order [id number])   Lab   Date: 3/6/2017 Department: [facility]ORATORY Released By: Nando Baldwin Authorizing: Case Rocha MD   Results   TSH (Order [id number])   3/6/2017  7:15 PM   Component Results   Component Value Flag Ref Range Units Status   TSH 12.100 (H) 0.460 - 4.680 mIU/mL Final   Lab and Collection   TSH (Order #[id number]) on 3/6/2017 - Lab and Collection Information   Additional Appointment Information   Visit Type Appointment Made By Appointment Made On   LAB Sridevi VELMA Syed 3/6/2017   Reviewed By Jacque Rocha MD on 3/6/2017  7:25 PM   Call Information      Provider Department [place]   3/6/2017 5:45 PM LAB [facility]ORATORY [facility] Laboratory [facility]   Testing Performed By   Lab - Abbreviation Name Director Address Valid Date Range   [id number] [facility] LAB [facility] Melo Mena MD [[id number]] <br>[address] [address] 10/26/15 1321-Present   Additional Information   Specimen ID Draw Type Specimen Type Specimen Source Bill Type Client ID   [id number] Venipuncture Blood      Specimen Date Taken Specimen Time Taken Specimen Received Date Specimen Received Time Result Date Result Time    Mar 6, 2017  6:03 PM Mar 6, 2017  6:03 PM Mar 6, 2017  7:15 PM   Recipient List for Orders   Sent From To Cc'd Forwarded To Results   3/6/2017  7:01 PM Lab, Background User Case Rocha MD   TSH [[id number]]          Order History   Outpatient   Date/Time Action Taken User Additional Information   03/06/17 1748 Release Nando Baldwin From Order: [id number]   03/06/17 1915 Result Lab, Background User Final   Order Details   Frequency Duration Priority Order Class   None None Routine Lab Collect   Order Information   Order Date/Time Release Date/Time Start Date/Time End Date/Time   02/20/17 10:29 AM 03/06/17 05:48 PM 03/06/17 05:48 PM None   Related Result Highlights       T4  Final result 3/6/2017            Clinical Indications      ICD-10-CM ICD-9-CM   Follicular adenoma     D34 226   Reprint Order Requisition   TSH (Order #[id number]) on 3/6/17   Encounter   View Encounter       Order Provider Info       Office phone Pager E-mail   Ordering User Case Rocha [phone] -- --   Authorizing Provider Case Rocha [phone] -- --   Lab Component SmartPhrase Guide   TSH (Order #[id number]) on 3/6/17   Order Transmittal Tracking   TSH (Order #[id number]) on 3/6/17   Medication Detail      Disp Refills Start End      TSH   3/6/2017 3/6/2017    Class: Lab Collect        Past Medical History:   Diagnosis Date   • Cancer     skin   • Cyst of left breast    • Disease of thyroid gland     nodule, no medications required   • Hypertension     hx.  pt state MD took her off meds   • Sleep apnea    • Sleep apnea        Past Surgical History:   Procedure Laterality Date   • BREAST CYST ASPIRATION  10/13/2013    Fine Needle Aspiration with Imaging Guidance [id number] (Breast cyst)    • NASAL SEPTUM SURGERY     • THYROIDECTOMY Left 2/13/2017    Procedure: LEFT THYROIDECTOMY, POSSIBLE TOTAL;  Surgeon: Case Rocha MD;  Location: [facility];  Service:    • TONSILLECTOMY      trimmed uvula and septoplasty          Current Outpatient  Prescriptions:   •  Biotin 10 MG capsule, Take 10 capsules by mouth Daily., Disp: , Rfl:   •  cholecalciferol (VITAMIN D3) 1000 UNITS tablet, Take 1,000 Units by mouth Daily., Disp: , Rfl:   •  Coconut Oil (EQL COCONUT OIL) 1000 MG capsule, Take 1,000 mg by mouth Daily., Disp: , Rfl:   •  desoximetasone (TOPICORT) 0.25 % cream, Apply 1 application topically As Needed for irritation., Disp: , Rfl:   •  levothyroxine (SYNTHROID) 100 MCG tablet, Take 1 tablet by mouth Daily., Disp: 30 tablet, Rfl: 11  •  Misc Natural Products (TART CHERRY ADVANCED) capsule, Take 1 capsule by mouth Daily., Disp: , Rfl:   •  Multiple Vitamin (MULTI VITAMIN DAILY PO), Take 1 tablet by mouth Daily., Disp: , Rfl:   •  oxymetazoline (NASAL DECONGESTANT SPRAY) 0.05 % nasal spray, 2 sprays into each nostril 2 (Two) Times a Day As Needed for congestion., Disp: , Rfl:     Allergies   Allergen Reactions   • Aleve [Naproxen] Hives   • Other Other (See Comments)     Lactic acid causes skin to peel       No family history on file.    Social History     Social History   • Marital status: Single     Spouse name: N/A   • Number of children: N/A   • Years of education: N/A     Occupational History   • Not on file.     Social History Main Topics   • Smoking status: Never Smoker   • Smokeless tobacco: Never Used   • Alcohol use No   • Drug use: No   • Sexual activity: Not on file     Other Topics Concern   • Not on file     Social History Narrative       Review of Systems    None    Physical Exam   Neck:             ASSESSMENT    Carli was seen today for follow-up and neck pain.    Diagnoses and all orders for this visit:    Hypothyroidism (acquired)  -     T4; Future  -     TSH; Future      PLAN    1. Recheck as needed          This document has been electronically signed by Case Rocha MD on April 3, 2017 11:26 AM

## 2017-07-05 ENCOUNTER — LAB (OUTPATIENT)
Dept: LAB | Facility: HOSPITAL | Age: 52
End: 2017-07-05
Attending: SURGERY

## 2017-07-05 DIAGNOSIS — E03.9 HYPOTHYROIDISM (ACQUIRED): ICD-10-CM

## 2017-07-05 LAB
T4 SERPL-MCNC: 8.08 MCG/DL (ref 5.5–11)
TSH SERPL DL<=0.05 MIU/L-ACNC: 1.29 MIU/ML (ref 0.46–4.68)

## 2017-07-05 PROCEDURE — 36415 COLL VENOUS BLD VENIPUNCTURE: CPT

## 2017-07-05 PROCEDURE — 84443 ASSAY THYROID STIM HORMONE: CPT

## 2017-07-05 PROCEDURE — 84436 ASSAY OF TOTAL THYROXINE: CPT

## 2018-02-20 ENCOUNTER — OFFICE VISIT (OUTPATIENT)
Dept: SLEEP MEDICINE | Facility: HOSPITAL | Age: 53
End: 2018-02-20

## 2018-02-20 VITALS
SYSTOLIC BLOOD PRESSURE: 130 MMHG | BODY MASS INDEX: 25.58 KG/M2 | DIASTOLIC BLOOD PRESSURE: 62 MMHG | HEIGHT: 67 IN | WEIGHT: 163 LBS

## 2018-02-20 DIAGNOSIS — G47.33 OBSTRUCTIVE SLEEP APNEA, ADULT: Primary | ICD-10-CM

## 2018-02-20 PROCEDURE — 99213 OFFICE O/P EST LOW 20 MIN: CPT | Performed by: INTERNAL MEDICINE

## 2018-02-20 NOTE — PROGRESS NOTES
Sleep Clinic Follow Up    Date: 2/20/2018  Primary Care Physician: No Known Provider      Interim History (1/3):  Since the last visit on 02/21/2017, patient has:      1)  JANNET - Has remained compliant with CPAP. She tried oral appliance from ~ 04/2017-08/2017 . She suffered with jaw pain, drooling, bleeding gums, and dry mouth. She went back to CPAP in ~ 08/2017 and has been compliant since that time. She denies mask and machine issues, dry mouth, headaches, pressures intolerance, or non-compliance. She denies abnormal dreams, sleep paralysis. She has allergic rhinitis treated with night time Afrin.     PAP Data:  Time frame: 08/2017-present   Compliance ~ 85%  PAP range : 8 cm H2O  Average 90% pressure: 8 cmH2O  Leak: 0   Average AHI 2.9 events/hr  Mask type: pillows  DME: [facility]    Bed time: 2200  Sleep latency: 30 minutes  Number of times awakens during the night: 2-3  Wake time: 0500  Estimated total sleep time at night: 6-7 hours  Caffeine intake: 1 soda  Alcohol intake: none  Nap time: none  Sleepiness with Driving: none    [place] - 12    2) Patient denies RLS symptoms.     PMHx, FH, SH reviewed and pertinent changes are: partial thyroid resection in 02/2017    REVIEW OF SYSTEMS:   Negative for chest pain, fever, chills, SOA, abdominal pain. Smoking: none      Exam (6-11/12):    Vitals:    02/20/18 1651   BP: 130/62     Body mass index is 25.53 kg/(m^2). Patient's BMI is within normal parameters. No follow-up required.      Gen:  No distress, conversant, pleasant, appears stated age, alert, oriented  Eyes:   Anicteric sclera, moist conjunctiva, no lid lag     PERRLA, EOMI   Heent:   NC/AT    Oropharynx clear, Mallampati 4    normal hearing  Lungs:  Normal effort, non-labored breathing    Clear to auscultation    CV:  Normal S1/S2, no murmur    no lower extremity edema  ABD:  Soft, normal bowel sounds    Psych:  Appropriate affect  Neuro:  CN 2-12 intact    Past Medical History:   Diagnosis Date   • Cancer      skin   • Cyst of left breast    • Disease of thyroid gland     nodule, no medications required   • Hypertension     hx.  pt state MD took her off meds   • Sleep apnea    • Sleep apnea        Current Outpatient Prescriptions:   •  Biotin 10 MG capsule, Take 10 capsules by mouth Daily., Disp: , Rfl:   •  cholecalciferol (VITAMIN D3) 1000 UNITS tablet, Take 1,000 Units by mouth Daily., Disp: , Rfl:   •  Coconut Oil (EQL COCONUT OIL) 1000 MG capsule, Take 1,000 mg by mouth Daily., Disp: , Rfl:   •  desoximetasone (TOPICORT) 0.25 % cream, Apply 1 application topically As Needed for irritation., Disp: , Rfl:   •  levothyroxine (SYNTHROID) 100 MCG tablet, Take 1 tablet by mouth Daily., Disp: 30 tablet, Rfl: 11  •  Misc Natural Products (TART CHERRY ADVANCED) capsule, Take 1 capsule by mouth Daily., Disp: , Rfl:   •  Multiple Vitamin (MULTI VITAMIN DAILY PO), Take 1 tablet by mouth Daily., Disp: , Rfl:   •  oxymetazoline (NASAL DECONGESTANT SPRAY) 0.05 % nasal spray, 2 sprays into each nostril 2 (Two) Times a Day As Needed for congestion., Disp: , Rfl:       ASSESSMENT / PLAN:     1. Obstructive sleep apnea  1. PSG on 08/14/2014, AHI of 7  2. Currently on 8 cm H2O  3. Continue PAP as prescribed.   4. Script for PAP supplies  5. Return to clinic in 1 year with compliance check unless sx change in the interim period.  2. Rhinitis  1. Stop afrin  2. OTC flonase and allegra    Total time 15 min, more than half spent in face to face counseling and coordination of care.     This document has been electronically signed by Jordan Tavares MD on February 20, 2018         CC: No Known Provider          No ref. provider found

## 2019-02-21 ENCOUNTER — OFFICE VISIT (OUTPATIENT)
Dept: SLEEP MEDICINE | Facility: HOSPITAL | Age: 54
End: 2019-02-21

## 2019-02-21 ENCOUNTER — TELEPHONE (OUTPATIENT)
Dept: SURGERY | Facility: CLINIC | Age: 54
End: 2019-02-21

## 2019-02-21 VITALS
OXYGEN SATURATION: 97 % | WEIGHT: 173.1 LBS | BODY MASS INDEX: 27.17 KG/M2 | DIASTOLIC BLOOD PRESSURE: 86 MMHG | SYSTOLIC BLOOD PRESSURE: 156 MMHG | HEART RATE: 78 BPM | HEIGHT: 67 IN

## 2019-02-21 DIAGNOSIS — F51.04 PSYCHOPHYSIOLOGICAL INSOMNIA: ICD-10-CM

## 2019-02-21 DIAGNOSIS — G25.81 RESTLESS LEGS SYNDROME (RLS): ICD-10-CM

## 2019-02-21 DIAGNOSIS — G47.33 OBSTRUCTIVE SLEEP APNEA, ADULT: Primary | ICD-10-CM

## 2019-02-21 PROCEDURE — 99214 OFFICE O/P EST MOD 30 MIN: CPT | Performed by: INTERNAL MEDICINE

## 2019-02-21 NOTE — PROGRESS NOTES
Sleep Clinic Follow Up    Date: 2019  Primary Care Physician: Provider, No Known    Last office visit: 2018 (I reviewed this note)    CC: Follow up: JANNET    Sleep Testin. PSG on 2014, AHI of 7  2. Currently on 8 cm H2O    Assessment and Plan:    1. Obstructive sleep apnea Established, stable (1)  1. Compliant and improved with PAP therapy  2. Continue PAP as prescribed.   3. Script for PAP supplies  4. Oral appliance from ~ 2017-2017   2. Restless leg syndrome/ /Valencia-Ekbom disease (RLS/WED) Self-limited or minor (1/2)  3. Insomnia - sleep onset and or maintenance Established, stable (1)   1. Not on therapy    Interim History (-3/4):  Since the last visit:    1) mild JANNET -  Carli Dunn has remained compliant with CPAP. She denies mask and machine issues, dry mouth, headaches, or pressures intolerance. She denies abnormal dreams, sleep paralysis, nasal congestion, URI sx.    PAP Data:    Time frame: 2018 - 2019   Compliance 82.2 %  Average use on days used: 4hrs 42 min  Percent of days with usage greater than or equal to 4 hours: 57.1%  PAP range : 8 cm H2O  Average 90% pressure: 8 cmH2O  Leak: 0 minutes  Average AHI 4.1 events/hr  Mask type: Nasal pillows  DME: [facility]    Bed time: 2230  Sleep latency: 30-60 minutes  Number of times awakens during the night: 1-3 mild insomnia  Wake time: 0500  Estimated total sleep time at night: 3-5 hours  Caffeine intake: 0oz of coffee, 0oz of tea, and 6oz of soda  Alcohol intake: 0 drinks per week  Nap time: none except on days off   Sleepiness with Driving: none    [place] - 10    2) Patient has intermittent RLS symptoms.     PMHx, FH, SH reviewed and pertinent changes are: unchanged from last office visit on 2018      REVIEW OF SYSTEMS:   Negative for chest pain, fever, cough, SOA, abdominal pain. Smoking:none    Exam ():  Vitals:    19 0849   BP: 156/86   Pulse: 78   SpO2: 97%           19  0849    Weight: 78.5 kg (173 lb 1.6 oz)     Body mass index is 27.1 kg/m². Patient's Body mass index is 27.1 kg/m². BMI is above normal parameters. Recommendations include: referral to primary care.      Gen:  No acute distress, alert, oriented  Lungs:  CTA with normal effort   CV:  RRR, no M/R/G  GI:  soft, non-tender  Psych:  Appropriate affect    Past Medical History:   Diagnosis Date   • Cancer (CMS/HCC)     skin   • Cyst of left breast    • Disease of thyroid gland     nodule, no medications required   • Hypertension     hx.  pt state MD took her off meds   • Sleep apnea    • Sleep apnea      No current outpatient medications on file.      RTC in 12 months     This document has been electronically signed by Jordan Tavares MD on February 21, 2019         CC: Provider, No Known          No ref. provider found

## 2020-02-15 DIAGNOSIS — E89.0 POSTOPERATIVE HYPOTHYROIDISM: Primary | ICD-10-CM

## 2020-02-25 ENCOUNTER — OFFICE VISIT (OUTPATIENT)
Dept: SLEEP MEDICINE | Facility: HOSPITAL | Age: 55
End: 2020-02-25

## 2020-02-25 VITALS
HEIGHT: 67 IN | OXYGEN SATURATION: 97 % | BODY MASS INDEX: 27.78 KG/M2 | SYSTOLIC BLOOD PRESSURE: 127 MMHG | WEIGHT: 177 LBS | HEART RATE: 77 BPM | DIASTOLIC BLOOD PRESSURE: 85 MMHG

## 2020-02-25 DIAGNOSIS — F45.8 OTHER SOMATOFORM DISORDERS: ICD-10-CM

## 2020-02-25 DIAGNOSIS — G47.33 OBSTRUCTIVE SLEEP APNEA, ADULT: Primary | ICD-10-CM

## 2020-02-25 DIAGNOSIS — G25.81 RESTLESS LEGS SYNDROME (RLS): ICD-10-CM

## 2020-02-25 PROCEDURE — 99213 OFFICE O/P EST LOW 20 MIN: CPT | Performed by: NURSE PRACTITIONER

## 2020-02-25 RX ORDER — LISINOPRIL 10 MG/1
TABLET ORAL DAILY
COMMUNITY
Start: 2020-02-04 | End: 2021-03-01 | Stop reason: SDUPTHER

## 2020-02-25 RX ORDER — LEVOTHYROXINE SODIUM 100 MCG
TABLET ORAL
COMMUNITY
Start: 2020-02-17

## 2020-02-25 NOTE — PROGRESS NOTES
Sleep Clinic Follow Up    Date: 2020  Primary Care Physician: Edwina Garcia APRN    Last office visit: 2019 (I reviewed this note)    CC: Follow up: JANNET on CPAP      Interim History:  Since the last visit:    1) mild JANNET -  Carli Dunn has mostly remained compliant with CPAP. She denies mask and machine issues, dry mouth, headaches, or pressures intolerance. She denies abnormal dreams, sleep paralysis, nasal congestion, URI sx.    Sleep Testin. PSG on 2014, AHI of 7   2. Currently on 8 cm H2O    PAP Data:    Time frame: 2019-2020   Compliance 84.3 %  Average use on days used: 4 hrs 44 min  Percent of days with usage greater than or equal to 4 hours: 59.6%  PAP range : 8 cm H2O  Average 90% pressure: 8 cmH2O  Leak: 0 minutes  Average AHI 3.3 events/hr  Mask type: Nasal pillows  [facility]    Bed time: 7650-5492  Sleep latency: 5-60 minutes  Number of times awakens during the night: 1-2  Wake time: 0500  Estimated total sleep time at night: 4-5 hours  Caffeine intake: 0 cups of coffee, 0 cups of tea, and 1-2 sodas per day  Alcohol intake: 0 drinks per week  Nap time: rare   Sleepiness with Driving: occasional    [place] - 14    2) Patient reports frequent RLS symptoms - kicking, burning, pain.    PMHx, FH, SH reviewed and pertinent changes are: Fell and broke nose - following up with ENT.       REVIEW OF SYSTEMS:   +chest pain  Negative for SOA, fever, chills, cough, N/V/D, abdominal pain.    Smoking:none        Exam:  Vitals:    20 0958   BP: 127/85   Pulse: 77   SpO2: 97%           20  0958   Weight: 80.3 kg (177 lb)     Body mass index is 27.72 kg/m². Patient's Body mass index is 27.72 kg/m². BMI is above normal parameters. Recommendations include: referral to primary care.      Gen:                No distress, conversant, pleasant, appears stated age, alert, oriented  Eyes:               Anicteric sclera, moist conjunctiva, no lid lag                            PERRL, EOMI   Heent:             NC/AT                          Oropharynx clear                          Normal hearing  Lungs:             Normal effort, non-labored breathing                          Clear to auscultation bilaterally          CV:                  Normal S1/S2, no murmur                          No lower extremity edema  ABD:               Soft, rounded, non-distended                          Normal bowel sounds                    Psych:             Appropriate affect  Neuro:             CN 2-12 appear intact    Past Medical History:   Diagnosis Date   • Cancer (CMS/HCC)     skin   • Cyst of left breast    • Disease of thyroid gland     nodule, no medications required   • Hypertension     hx.  pt state MD took her off meds   • Sleep apnea    • Sleep apnea        Current Outpatient Medications:   •  lisinopril (PRINIVIL,ZESTRIL) 10 MG tablet, Take  by mouth Daily., Disp: , Rfl:   •  SYNTHROID 100 MCG tablet, , Disp: , Rfl:       Assessment and Plan:    1. Obstructive sleep apnea Established, stable (1)  1. Mostly compliant with PAP therapy  2. Continue PAP as prescribed  3. Script for PAP supplies  4. Return to clinic in 1 year with compliance report unless change in symptoms in interim period  2. Restless leg syndrome/ /Valencia-Ekbom disease (RLS/WED) New (to me), additional work-up planned (4)   1. Check ferritin  2. Consider requip or mirapex  3. Short sleeper syndrome         10 of 20 minutes spent face-to-face counseling patient extensively regarding:   PAP therapy, PAP compliance, PAP maintenance and Sleep hygiene      RTC in 12 months. Patient agrees to return sooner if changes in symptoms.        This document has been electronically signed by DALTON Templeton on February 25, 2020 10:05 AM          CC: Edwina Garcia APRN          No ref. provider found

## 2021-03-01 ENCOUNTER — OFFICE VISIT (OUTPATIENT)
Dept: SLEEP MEDICINE | Facility: HOSPITAL | Age: 56
End: 2021-03-01

## 2021-03-01 VITALS
OXYGEN SATURATION: 98 % | DIASTOLIC BLOOD PRESSURE: 71 MMHG | HEIGHT: 67 IN | BODY MASS INDEX: 27.15 KG/M2 | HEART RATE: 70 BPM | WEIGHT: 173 LBS | SYSTOLIC BLOOD PRESSURE: 123 MMHG

## 2021-03-01 DIAGNOSIS — G25.81 RESTLESS LEGS SYNDROME (RLS): ICD-10-CM

## 2021-03-01 DIAGNOSIS — G47.33 OBSTRUCTIVE SLEEP APNEA, ADULT: Primary | ICD-10-CM

## 2021-03-01 DIAGNOSIS — F51.8 SHORT SLEEPER SYNDROME: ICD-10-CM

## 2021-03-01 DIAGNOSIS — J34.2 NASAL SEPTAL DEVIATION: ICD-10-CM

## 2021-03-01 PROCEDURE — 99213 OFFICE O/P EST LOW 20 MIN: CPT | Performed by: NURSE PRACTITIONER

## 2021-03-01 RX ORDER — CITALOPRAM 20 MG/1
TABLET ORAL
COMMUNITY
Start: 2021-02-20

## 2021-03-01 RX ORDER — FAMOTIDINE 40 MG/1
TABLET, FILM COATED ORAL
COMMUNITY
Start: 2021-02-20

## 2021-03-01 RX ORDER — OMEGA-3S/DHA/EPA/FISH OIL/D3 300MG-1000
400 CAPSULE ORAL DAILY
COMMUNITY

## 2021-03-01 RX ORDER — LISINOPRIL 20 MG/1
TABLET ORAL
COMMUNITY
Start: 2021-01-26 | End: 2021-06-01 | Stop reason: SDUPTHER

## 2021-03-01 NOTE — PROGRESS NOTES
Sleep Clinic Follow Up    Date: 3/1/2021  Primary Care Provider: Edwina Garcia APRN    Last office visit: 2020 (I reviewed this note)    CC: Follow up: JANNET on CPAP      Interim History:  Since the last visit:    1) mild JANNET -  Carli Dunn has mostly remained compliant with CPAP. She denies mask and machine issues, dry mouth, headaches, or pressures intolerance. She denies abnormal dreams, sleep paralysis, nasal congestion, URI sx. She has had some reoccurrence of daytime sleepiness and restless legs.    Of note, patient had her nose broken 1.5 years ago and she has had trouble with mask fit and discomfort since then. She previously had a nasal septoplasty before her nose got re-broken. She also has been told that she has scar tissue in her nose.    2) Patient reports occasional RLS symptoms. Usually is worse in the summer. Better when she wears thick or heavy socks.    Sleep Testin. PSG on 2020, AHI of 7   2. Currently on 8 cm H2O    PAP Data:    Time frame: 2020-2021   Compliance: 83.3 %  Average use on days used: 4 hrs 17 min  Percent of days with usage greater than or equal to 4 hours: 50.4%  PAP range: 8 cm H2O  Average 90% pressure: 8 cmH2O  Leak: 0 minutes  Average AHI: 7.5 events/hr  Mask type: Nasal pillows  DME: [facility]    Bed time: 9159-0376  Sleep latency: 10-30 minutes  Number of times awakens during the night: 1-2  Wake time: 0500  Estimated total sleep time at night: 5-6 hours  Caffeine intake: 0 cups of coffee, 0 cups of tea, and 0-1 sodas per day  Alcohol intake: 0 drinks per week  Nap time: couple times per week   Sleepiness with Driving: rare     [place] - 13    PMHx, FH, SH reviewed and pertinent changes are: Reportedly unchanged from last office visit with us.      REVIEW OF SYSTEMS:   Negative for chest pain, SOA, fever, chills, cough, N/V/D, abdominal pain.    Smoking:none    Carli Dunn  reports that she has never smoked. She has never used  smokeless tobacco.         Exam:  Vitals:    03/01/21 0810   BP: 123/71   Pulse: 70   SpO2: 98%           03/01/21  0810   Weight: 78.5 kg (173 lb)     Body mass index is 27.09 kg/m². Patient's Body mass index is 27.09 kg/m². BMI is above normal parameters. Recommendations include: referral to primary care.      Gen:                No distress, conversant, pleasant, appears stated age, alert, oriented  Eyes:               Anicteric sclera, moist conjunctiva, no lid lag                           PERRL, EOMI   Heent:             NC/AT    Nasal septal deviation                          Oropharynx clear                          Normal hearing  Lungs:             Normal effort, non-labored breathing                          Clear to auscultation bilaterally          CV:                  Normal S1/S2, no murmur                          No lower extremity edema  ABD:               Soft, rounded, non-distended                          Normal bowel sounds                    Psych:             Appropriate affect  Neuro:             CN 2-12 appear intact    Past Medical History:   Diagnosis Date   • Cancer (CMS/HCC)     skin   • Cyst of left breast    • Disease of thyroid gland     nodule, no medications required   • Hypertension     hx.  pt state MD took her off meds   • Sleep apnea    • Sleep apnea        Current Outpatient Medications:   •  Calcium Carbonate-Vitamin D3 600-400 MG-UNIT tablet, Take 1 tablet by mouth Daily., Disp: , Rfl:   •  cholecalciferol (VITAMIN D3) 10 MCG (400 UNIT) tablet, Take 400 Units by mouth Daily., Disp: , Rfl:   •  citalopram (CeleXA) 20 MG tablet, , Disp: , Rfl:   •  famotidine (PEPCID) 40 MG tablet, , Disp: , Rfl:   •  lisinopril (PRINIVIL,ZESTRIL) 20 MG tablet, , Disp: , Rfl:   •  SYNTHROID 100 MCG tablet, , Disp: , Rfl:     No results found for: WBC, RBC, HGB, HCT, MCV, MCH, MCHC, RDW, RDWSD, MPV, PLT, NEUTRORELPCT, LYMPHORELPCT, MONORELPCT, EOSRELPCT, BASORELPCT, AUTOIGPER, NEUTROABS,  LYMPHSABS, MONOSABS, EOSABS, BASOSABS, AUTOIGNUM, NRBC    No results found for: GLUCOSE, BUN, CREATININE, EGFRIFNONA, EGFRIFAFRI, BCR, K, CO2, CALCIUM, PROTENTOTREF, ALBUMIN, LABIL2, BILIRUBIN, AST, ALT      Assessment and Plan:    1. Obstructive sleep apnea - Established, stable (1)  1. Mostly compliant with PAP therapy  2. Continue PAP as prescribed  3. Script for PAP supplies  4. Recommended chin strap versus switching to full face mask - patient will try full face mask first (discussed [employer] or other modified/hybrid style)  5. Increase pressure to 10 cm H2O  6. Return to clinic in 3 months with compliance report unless change in symptoms in interim period  2. Restless leg syndrome/Periodic limb movement disorder (RLS/PLMD) - self-limited or minor problem   3. Short sleeper syndrome - Established, stable        4. Nasal septal deviation - Established, stable   1.   Continue following with ENT      I spent 20 minutes caring for Carli on this date of service. This time includes time spent by me in the following activities: preparing for the visit, reviewing tests, obtaining and/or reviewing a separately obtained history, performing a medically appropriate examination and/or evaluation , counseling and educating the patient/family/caregiver, documenting information in the medical record and independently interpreting results and communicating that information with the patient/family/caregiver; discussing PAP therapy, PAP compliance and PAP maintenance    RTC in 3 months. Patient agrees to return sooner if changes in symptoms.        This document has been electronically signed by DALTON Templeton on March 1, 2021 08:12 CST          CC: Edwina Garcia APRN          No ref. provider found

## 2021-06-01 ENCOUNTER — OFFICE VISIT (OUTPATIENT)
Dept: SLEEP MEDICINE | Facility: HOSPITAL | Age: 56
End: 2021-06-01

## 2021-06-01 VITALS
HEART RATE: 72 BPM | SYSTOLIC BLOOD PRESSURE: 130 MMHG | DIASTOLIC BLOOD PRESSURE: 73 MMHG | HEIGHT: 67 IN | OXYGEN SATURATION: 98 % | WEIGHT: 182 LBS | BODY MASS INDEX: 28.56 KG/M2

## 2021-06-01 DIAGNOSIS — G47.33 OBSTRUCTIVE SLEEP APNEA, ADULT: Primary | ICD-10-CM

## 2021-06-01 PROCEDURE — 99212 OFFICE O/P EST SF 10 MIN: CPT | Performed by: NURSE PRACTITIONER

## 2021-06-01 RX ORDER — LISINOPRIL 10 MG/1
10 TABLET ORAL DAILY
COMMUNITY
Start: 2021-05-27

## 2021-06-01 NOTE — PROGRESS NOTES
Sleep Clinic Follow Up    Date: 2021  Primary Care Provider: Edwina Garcia APRN    Last office visit: 2021 (I reviewed this note)    CC: Follow up: JANNET on CPAP, previously high AHI and PAP difficulties      Interim History:  Since the last visit:    1) mild JANNET -  Carli Dunn has remained compliant with CPAP. She denies mask and machine issues, dry mouth, headaches, or pressures intolerance. She denies abnormal dreams, sleep paralysis, nasal congestion, URI sx.    2) Patient reports rare RLS symptoms.     Sleep Testin. PSG on 2020, AHI of 7   2. Currently on 8 cm H2O (was supposed to be changed to 10, pressure change did not take last time)    PAP Data:    Time frame: 2021-2021   Compliance 98.9 %  Average use on days used: 5 hrs 50 min  Percent of days with usage greater than or equal to 4 hours: 87.8%  PAP range : 8 cm H2O  Average 90% pressure: 8 cmH2O  Leak: 4 minutes  Average AHI 6.9 events/hr  Mask type: Full face mask - [employer]  DME: [facility]    Bed time: [id number]  Sleep latency: 10-30 minutes  Number of times awakens during the night: 1-2  Wake time: 0500  Estimated total sleep time at night: 5-6 hours  Caffeine intake: 0 cups of coffee, 0 cups of tea, and 1 sodas per day  Alcohol intake: 0 drinks per week  Nap time: on days off   Sleepiness with Driving: occasional     [place] - 14    PMHx, FH, SH reviewed and pertinent changes are: Reportedly unchanged from last office visit with us.      REVIEW OF SYSTEMS:   Negative for chest pain, SOA, fever, chills, cough, N/V/D, abdominal pain.    Smoking:none    Carli Dunn  reports that she has never smoked. She has never used smokeless tobacco.    Exam:  Vitals:    21 1012   BP: 130/73   Pulse: 72   SpO2: 98%           21  1012   Weight: 82.6 kg (182 lb)     Body mass index is 28.5 kg/m². Patient's Body mass index is 28.5 kg/m². indicating that she is overweight (BMI 25-29.9).  Obesity-related health conditions include the following: obstructive sleep apnea, hypertension and GERD. Obesity is unchanged. BMI is is above average; BMI management plan is completed. I recommend portion control and increasing exercise.      Gen:                No distress, conversant, pleasant, appears stated age, alert, oriented  Eyes:               Anicteric sclera, moist conjunctiva, no lid lag                           PERRL, EOMI   Heent:             NC/AT                          Oropharynx clear                          Normal hearing  Lungs:             Normal effort, non-labored breathing                          Clear to auscultation bilaterally          CV:                  Normal S1/S2, no murmur                          No lower extremity edema  ABD:               Soft, rounded, non-distended                          Normal bowel sounds                    Psych:             Appropriate affect  Neuro:             CN 2-12 appear intact    Past Medical History:   Diagnosis Date   • Cancer (CMS/HCC)     skin   • Cyst of left breast    • Disease of thyroid gland     nodule, no medications required   • Hypertension     hx.  pt state MD took her off meds   • Sleep apnea    • Sleep apnea        Current Outpatient Medications:   •  Calcium Carbonate-Vitamin D3 600-400 MG-UNIT tablet, Take 1 tablet by mouth Daily., Disp: , Rfl:   •  cholecalciferol (VITAMIN D3) 10 MCG (400 UNIT) tablet, Take 400 Units by mouth Daily., Disp: , Rfl:   •  citalopram (CeleXA) 20 MG tablet, , Disp: , Rfl:   •  famotidine (PEPCID) 40 MG tablet, , Disp: , Rfl:   •  lisinopril (PRINIVIL,ZESTRIL) 10 MG tablet, Take 10 mg by mouth Daily., Disp: , Rfl:   •  SYNTHROID 100 MCG tablet, , Disp: , Rfl:     No results found for: WBC, RBC, HGB, HCT, MCV, MCH, MCHC, RDW, RDWSD, MPV, PLT, NEUTRORELPCT, LYMPHORELPCT, MONORELPCT, EOSRELPCT, BASORELPCT, AUTOIGPER, NEUTROABS, LYMPHSABS, MONOSABS, EOSABS, BASOSABS, AUTOIGNUM, NRBC    No results found  for: GLUCOSE, BUN, CREATININE, EGFRIFNONA, EGFRIFAFRI, BCR, K, CO2, CALCIUM, PROTENTOTREF, ALBUMIN, LABIL2, BILIRUBIN, AST, ALT       Assessment and Plan:    1. Obstructive sleep apnea - Established, stable (1)  1. Compliant with PAP therapy  2. Continue PAP as prescribed - increase pressure to 10 cm H2O since pressure change did not occur last visit  3. Return to clinic in 2 months with compliance report unless change in symptoms in interim period  2. Daytime sleepiness - Established, stable (1)  1. Address after CPAP pressure change      I spent 15 minutes caring for Carli on this date of service. This time includes time spent by me in the following activities: preparing for the visit, reviewing tests, obtaining and/or reviewing a separately obtained history, performing a medically appropriate examination and/or evaluation , counseling and educating the patient/family/caregiver and documenting information in the medical record; discussing PAP therapy, PAP compliance and PAP maintenance    RTC in 2 months. Patient agrees to return sooner if changes in symptoms.        This document has been electronically signed by DALTON Templeton on June 1, 2021 10:18 CDT          CC: Edwina Garcia APRN          No ref. provider found

## 2021-08-06 ENCOUNTER — OFFICE VISIT (OUTPATIENT)
Dept: SLEEP MEDICINE | Facility: HOSPITAL | Age: 56
End: 2021-08-06

## 2021-08-06 VITALS
DIASTOLIC BLOOD PRESSURE: 74 MMHG | HEIGHT: 67 IN | HEART RATE: 73 BPM | SYSTOLIC BLOOD PRESSURE: 121 MMHG | BODY MASS INDEX: 28.72 KG/M2 | WEIGHT: 183 LBS | OXYGEN SATURATION: 96 %

## 2021-08-06 DIAGNOSIS — G47.33 OBSTRUCTIVE SLEEP APNEA, ADULT: Primary | ICD-10-CM

## 2021-08-06 DIAGNOSIS — G47.19 EXCESSIVE DAYTIME SLEEPINESS: ICD-10-CM

## 2021-08-06 PROCEDURE — 99213 OFFICE O/P EST LOW 20 MIN: CPT | Performed by: NURSE PRACTITIONER

## 2021-08-06 NOTE — PROGRESS NOTES
Sleep Clinic Follow Up    Date: 2021  Primary Care Provider: Edwina Garcia APRN    Last office visit: 2021 (I reviewed this note)    CC: Follow up: JANNET on CPAP      Interim History:  Since the last visit:    1) mild JANNET -  Carli Dunn has remained compliant with CPAP. She denies mask and machine issues, dry mouth, headaches, or pressures intolerance. She denies abnormal dreams, sleep paralysis, nasal congestion, URI sx.    2) Patient denies RLS symptoms.     Sleep Testin. PSG on 2020, AHI of 7   2. Currently on 10 cm H2O    PAP Data:    No new data available  Mask type: Full face mask  DME: [facility]    Bed time: [id number]  Sleep latency: 10-30 minutes  Number of times awakens during the night: 1-2  Wake time: 0500  Estimated total sleep time at night: 7-8 hours  Caffeine intake: 0 cups of coffee, 0 cups of tea, and 1 sodas per day  Alcohol intake: 0 drinks per week  Nap time: on days off   Sleepiness with Driving: occasional - no close calls or accidents     [place] - 14    PMHx, FH, SH reviewed and pertinent changes are: Reportedly unchanged from last office visit with us.      REVIEW OF SYSTEMS:   +LE edema occasionally  Negative for chest pain, SOA, fever, chills, cough, N/V/D, abdominal pain.    Smoking:none    Carli Dunn  reports that she has never smoked. She has never used smokeless tobacco.    Exam:  Vitals:    21 0831   BP: 121/74   Pulse: 73   SpO2: 96%           21  0831   Weight: 83 kg (183 lb)     Body mass index is 28.66 kg/m². Patient's Body mass index is 28.66 kg/m². indicating that she is overweight (BMI 25-29.9). Obesity-related health conditions include the following: obstructive sleep apnea, hypertension and GERD. Obesity is unchanged. BMI is is above average; BMI management plan is completed. I recommend portion control and increasing exercise.      Gen:                No distress, conversant, pleasant, appears stated age, alert,  oriented  Eyes:               Anicteric sclera, moist conjunctiva, no lid lag                           PERRL, EOMI   Heent:             NC/AT                          Oropharynx clear                          Normal hearing  Lungs:             Normal effort, non-labored breathing                          Clear to auscultation bilaterally          CV:                  Normal S1/S2, no murmur                          No lower extremity edema  ABD:               Soft, rounded, non-distended                          Normal bowel sounds                    Psych:             Appropriate affect  Neuro:             CN 2-12 appear intact    Past Medical History:   Diagnosis Date   • Cancer (CMS/HCC)     skin   • Cyst of left breast    • Disease of thyroid gland     nodule, no medications required   • Hypertension     hx.  pt state MD took her off meds   • Sleep apnea    • Sleep apnea        Current Outpatient Medications:   •  Calcium Carbonate-Vitamin D3 600-400 MG-UNIT tablet, Take 1 tablet by mouth Daily., Disp: , Rfl:   •  cholecalciferol (VITAMIN D3) 10 MCG (400 UNIT) tablet, Take 400 Units by mouth Daily., Disp: , Rfl:   •  citalopram (CeleXA) 20 MG tablet, , Disp: , Rfl:   •  famotidine (PEPCID) 40 MG tablet, , Disp: , Rfl:   •  lisinopril (PRINIVIL,ZESTRIL) 10 MG tablet, Take 10 mg by mouth Daily., Disp: , Rfl:   •  SYNTHROID 100 MCG tablet, , Disp: , Rfl:     No results found for: WBC, RBC, HGB, HCT, MCV, MCH, MCHC, RDW, RDWSD, MPV, PLT, NEUTRORELPCT, LYMPHORELPCT, MONORELPCT, EOSRELPCT, BASORELPCT, AUTOIGPER, NEUTROABS, LYMPHSABS, MONOSABS, EOSABS, BASOSABS, AUTOIGNUM, NRBC    No results found for: GLUCOSE, BUN, CREATININE, EGFRIFNONA, EGFRIFAFRI, BCR, K, CO2, CALCIUM, PROTENTOTREF, ALBUMIN, LABIL2, BILIRUBIN, AST, ALT    Assessment and Plan:    1. Obstructive sleep apnea - Established, stable (1)  1. Compliant with PAP therapy  2. Advised patient of new safety recall of [employer] CPAP/BiPAP/AVAPS  machines. We discussed the risk versus benefit of continuing usage of PAP therapy. The company has recommended that patients stop usage of their current machines. Instructed patient to call [employer] ([phone]) to check if the machine is under warranty for repair or replacement. [place] machine with serial number on website: [email or web address]. Follow up with DME company regarding any further questions, or for consideration for new machine once eligible. Advised patient to avoid unapproved ozone-type CPAP . Advised to call us if any further questions or problems.  3. Script for PAP supplies and new CPAP @ 10 cm H2O  4. Return to clinic in 3 months with compliance report unless change in symptoms in interim period  2. Excessive daytime sleepiness - Established, stable (1)       I spent 20 minutes caring for Carli on this date of service. This time includes time spent by me in the following activities: preparing for the visit, reviewing tests, obtaining and/or reviewing a separately obtained history, performing a medically appropriate examination and/or evaluation , counseling and educating the patient/family/caregiver and documenting information in the medical record; discussing PAP therapy, PAP compliance and PAP maintenance    Patient to follow up in 31-90 days with compliance report. Patient agrees to return sooner if changes in symptoms.        This document has been electronically signed by DALTON Templeton on August 6, 2021 08:37 CDT          CC: Edwina Garcia APRN          No ref. provider found

## 2021-08-06 NOTE — PATIENT INSTRUCTIONS
*Call [employer] to register machine's serial number: [id number]  *[employer]' website: [email or web address]

## 2023-04-12 ENCOUNTER — OFFICE VISIT (OUTPATIENT)
Dept: SLEEP MEDICINE | Facility: HOSPITAL | Age: 58
End: 2023-04-12
Payer: COMMERCIAL

## 2023-04-12 VITALS
HEIGHT: 67 IN | SYSTOLIC BLOOD PRESSURE: 153 MMHG | HEART RATE: 72 BPM | BODY MASS INDEX: 26.06 KG/M2 | OXYGEN SATURATION: 96 % | DIASTOLIC BLOOD PRESSURE: 89 MMHG | WEIGHT: 166 LBS

## 2023-04-12 DIAGNOSIS — G47.33 OBSTRUCTIVE SLEEP APNEA: Primary | ICD-10-CM

## 2023-04-12 PROCEDURE — 99213 OFFICE O/P EST LOW 20 MIN: CPT | Performed by: NURSE PRACTITIONER

## 2023-04-12 NOTE — PROGRESS NOTES
Sleep Clinic Follow Up    Date: 2023  Primary Care Provider: Edwina Garcia APRN    Last office visit: 2021 (I reviewed this note)    CC: Follow up: JANNET on CPAP      Interim History:  Since the last visit:    1) mild JANNET -  Carli Analia Dunn has reportedly remained compliant with CPAP. She denies mask and machine issues, dry mouth, headaches, or pressures intolerance. She denies abnormal dreams, sleep paralysis, nasal congestion, URI sx. Her data card is unfortunately stuck in her machine and we are not able to get it out today for download. She received this machine 2022.    2) Patient reports occasional RLS symptoms.     Sleep Testin. PSG on 2020, AHI of 7   2. Currently on 10 cm H2O    PAP Data:    Unable to download data card  Mask type: Full face mask - modified  DME: [facility]  Machine type: 3B Medical Gina II    Bed time: 2882-6276  Sleep latency: 30 minutes  Number of times awakens during the night: 1-2  Wake time: 0500  Estimated total sleep time at night: 7-8 hours  Caffeine intake: 0 cups of coffee, 0 cups of tea, and 1 sodas per day  Alcohol intake: 0 drinks per week  Nap time: none   Sleepiness with Driving: rare    [employer] - 14  [employer] Sleepiness Scale  Sitting and reading: Moderate chance of dozing  Watching TV: Moderate chance of dozing  Sitting, inactive in a public place (e.g. a theatre or a meeting): High chance of dozing  As a passenger in a car for an hour without a break: Moderate chance of dozing  Lying down to rest in the afternoon when circumstances permit: High chance of dozing  Sitting and talking to someone: Would never doze  Sitting quietly after a lunch without alcohol: Slight chance of dozing  In a car, while stopped for a few minutes in traffic: Slight chance of dozing  Total score: 14    PMHx, FH, SH reviewed and pertinent changes are: Reportedly unchanged from last office visit with us.      Review of Systems:   Negative for chest pain, SOA, fever,  chills, cough, N/V/D, abdominal pain.    Smoking:none    Carli Dunn  reports that she has never smoked. She has never used smokeless tobacco.    Physical Exam:  Vitals:    04/12/23 1038   BP: 153/89   Pulse: 72   SpO2: 96%           04/12/23  1038   Weight: 75.3 kg (166 lb)     Body mass index is 25.99 kg/m². BMI is >= 25 and <30. (Overweight) The following options were offered after discussion;: referral to primary care    Gen:                No distress, conversant, pleasant, appears stated age, alert, oriented  Eyes:               Anicteric sclera, moist conjunctiva, no lid lag                           PERRL, EOMI   Heent:             NC/AT                          Oropharynx clear                          Normal hearing  Lungs:             Normal effort, non-labored breathing  CV:                  Normal S1/S2                          no lower extremity edema  ABD:               Soft, rounded, non-distended                 Psych:             Appropriate affect  Neuro:             CN 2-12 appear intact    Past Medical History:   Diagnosis Date   • Cancer     skin   • Cyst of left breast    • Disease of thyroid gland     nodule, no medications required   • Hypertension     hx.  pt state MD took her off meds   • Sleep apnea    • Sleep apnea        Current Outpatient Medications:   •  Calcium Carbonate-Vitamin D3 600-400 MG-UNIT tablet, Take 1 tablet by mouth Daily., Disp: , Rfl:   •  cholecalciferol (VITAMIN D3) 10 MCG (400 UNIT) tablet, Take 1 tablet by mouth Daily., Disp: , Rfl:   •  citalopram (CeleXA) 20 MG tablet, , Disp: , Rfl:   •  lisinopril (PRINIVIL,ZESTRIL) 10 MG tablet, Take 1 tablet by mouth Daily., Disp: , Rfl:   •  SYNTHROID 100 MCG tablet, , Disp: , Rfl:   •  famotidine (PEPCID) 40 MG tablet, , Disp: , Rfl:     WBC   Date Value Ref Range Status   02/13/2023 7.0 4.1 - 10.9 10E3/uL Final     RBC   Date Value Ref Range Status   02/13/2023 5.19 4.20 - 5.40 10E6/uL Final     Hemoglobin   Date  Value Ref Range Status   02/13/2023 14.8 12.0 - 16.0 g/dL Final     Hematocrit   Date Value Ref Range Status   02/13/2023 43.9 37.0 - 47.0 % Final     MCV   Date Value Ref Range Status   02/13/2023 84.6 80.0 - 97.0 fL Final     MCH   Date Value Ref Range Status   02/13/2023 28.5 26.0 - 32.0 pg Final     MCHC   Date Value Ref Range Status   02/13/2023 33.7 31.0 - 35.0 g/dL Final     MPV   Date Value Ref Range Status   02/13/2023 10.0 9.4 - 12.4 fL Final     Platelets   Date Value Ref Range Status   02/13/2023 240.0 140.0 - 440.0 10E3/uL Final     Neutrophil %   Date Value Ref Range Status   02/13/2023 50.8 34.0 - 71.1 % Final     Lymphocyte %   Date Value Ref Range Status   02/13/2023 39.5 20.0 - 42.0 % Final     Monocyte %   Date Value Ref Range Status   02/13/2023 6.3 4.7 - 12.5 % Final     Basophil %   Date Value Ref Range Status   02/13/2023 1.0 0.1 - 1.2 % Final     Immature Grans %   Date Value Ref Range Status   02/13/2023 0.4 0.0 - 0.8 % Final     Neutrophils Absolute   Date Value Ref Range Status   02/13/2023 3.6 1.6 - 7.7 10E3/uL Final     Lymphocytes Absolute   Date Value Ref Range Status   02/13/2023 2.76 1.10 - 4.40 10E3/uL Final     Monocytes Absolute   Date Value Ref Range Status   02/13/2023 0.4 0.3 - 1.3 10E3/uL Final     Eosinophils Absolute   Date Value Ref Range Status   02/13/2023 0.14 0.07 - 0.76 10E3/uL Final     Basophils Absolute   Date Value Ref Range Status   02/13/2023 0.07 0.00 - 0.13 10E3/uL Final       Lab Results   Component Value Date    GLUCOSE 88.0 02/13/2023    BUN 16.0 02/13/2023    CREATININE 0.77 02/13/2023    K 4.6 02/13/2023    CALCIUM 9.6 02/13/2023    ALBUMIN 4.7 02/13/2023    BILITOT 0.64 02/13/2023    AST 25.0 02/13/2023    ALT 20.0 02/13/2023       Assessment and Plan:    1. Obstructive sleep apnea - Established, stable (1)  1. Reportedly compliant with PAP therapy - take machine and card to DME for assistance  2. Continue PAP as prescribed  3. Script for PAP  supplies  4. Pertinent labs were reviewed as listed above  5. Return to clinic in 1 year with compliance report unless change in symptoms in interim period      I spent 15 minutes caring for Carli on this date of service. This time includes time spent by me in the following activities: preparing for the visit, reviewing tests, obtaining and/or reviewing a separately obtained history, performing a medically appropriate examination and/or evaluation , counseling and educating the patient/family/caregiver, documenting information in the medical record and care coordination; discussing PAP therapy, PAP compliance and PAP maintenance    RTC in 12 months. Patient agrees to return sooner if changes in symptoms.        This document has been electronically signed by DALTON Carrion on April 12, 2023 10:51 CDT          CC: Edwina Garcia APRN          No ref. provider found

## (undated) DEVICE — INTENDED TO BE USED TO OCCLUDE, RETRACT AND IDENTIFY ARTERIES, VEINS, TENDONS AND NERVES IN SURGICAL PROCEDURES: Brand: STERION®  VESSEL LOOP

## (undated) DEVICE — GLV SURG TRIUMPH LT PF LTX 7 STRL

## (undated) DEVICE — GOWN,AURORA,NOREINF,RAGLAN,XL,STERILE: Brand: MEDLINE

## (undated) DEVICE — ADHS LIQ MASTISOL 2/3ML

## (undated) DEVICE — SPNG DISSCT SECTO KTTNER PK/5

## (undated) DEVICE — PREP SOL POVIDONE/IODINE BT 4OZ

## (undated) DEVICE — LIGHT HANDLE: Brand: DEVON

## (undated) DEVICE — DISPOSABLE BIPOLAR CODE, 12' (3.66 M): Brand: CONMED

## (undated) DEVICE — GLV SURG TRIUMPH LT PF LTX 7.5 STRL

## (undated) DEVICE — GLV SURG TRIUMPH LT PF LTX 6.5 STRL

## (undated) DEVICE — DRN PENRS 1/4X18IN LTX

## (undated) DEVICE — 3M™ STERI-STRIP™ REINFORCED ADHESIVE SKIN CLOSURES, R1547, 1/2 IN X 4 IN (12 MM X 100 MM), 6 STRIPS/ENVELOPE: Brand: 3M™ STERI-STRIP™

## (undated) DEVICE — GAUZE,SPONGE,4"X4",16PLY,XRAY,STRL,LF: Brand: MEDLINE

## (undated) DEVICE — GLV SURG SENSICARE ALOE LF PF SZ7.5 GRN

## (undated) DEVICE — GOWN,NON-REINFORCED,4XL: Brand: MEDLINE

## (undated) DEVICE — SOL IRR NACL 0.9PCT BT 1000ML

## (undated) DEVICE — STERILE POLYISOPRENE POWDER-FREE SURGICAL GLOVES WITH EMOLLIENT COATING: Brand: PROTEXIS

## (undated) DEVICE — POSTN HD RING CUSH 9IN LF

## (undated) DEVICE — DRP WARMR MACH

## (undated) DEVICE — ANTIBACTERIAL UNDYED BRAIDED (POLYGLACTIN 910), SYNTHETIC ABSORBABLE SUTURE: Brand: COATED VICRYL

## (undated) DEVICE — GLV SURG SENSICARE GREEN W/ALOE PF LF 8 STRL

## (undated) DEVICE — GLV SURG SENSICARE GREEN W/ALOE PF LF 7 STRL

## (undated) DEVICE — SUT PERMAHAND SILK 3/0 SH1 18IN

## (undated) DEVICE — SUT VICRYL 3-0 SH-1 PO 18IN J772D

## (undated) DEVICE — STPLR SKIN VISISTAT WD 35CT

## (undated) DEVICE — PK MAJ PROC LF 60

## (undated) DEVICE — SUT SILK 3-0 TIES 18IN SA64H

## (undated) DEVICE — PREP PVP-I 7.5P BT 4OZ

## (undated) DEVICE — GLV SURG TRIUMPH PF LTX 6.5 STRL